# Patient Record
Sex: MALE | Race: WHITE | Employment: FULL TIME | URBAN - METROPOLITAN AREA
[De-identification: names, ages, dates, MRNs, and addresses within clinical notes are randomized per-mention and may not be internally consistent; named-entity substitution may affect disease eponyms.]

---

## 2020-12-03 ENCOUNTER — OFFICE VISIT (OUTPATIENT)
Dept: ORTHOPEDIC SURGERY | Age: 60
End: 2020-12-03
Payer: COMMERCIAL

## 2020-12-03 VITALS — BODY MASS INDEX: 38.14 KG/M2 | HEIGHT: 67 IN | WEIGHT: 243 LBS

## 2020-12-03 PROCEDURE — L1830 KO IMMOB CANVAS LONG PRE OTS: HCPCS | Performed by: ORTHOPAEDIC SURGERY

## 2020-12-03 PROCEDURE — 99203 OFFICE O/P NEW LOW 30 MIN: CPT | Performed by: ORTHOPAEDIC SURGERY

## 2020-12-03 NOTE — PROGRESS NOTES
Chief Complaint    Knee Pain (LEFT KNEE: 10 year hx of pain, has had several cortisone injections. ACL TEAR in college. )      History of Present Illness:  Kendall Ferrer is a 61 y.o. male who comes in today for evaluation and treatment of longstanding left knee pain. He is referred in today for orthopedic consultation at the request of his PCP Dr. Orly Harrell. He had a long history of left knee troubles. Had a multiligament reconstruction over 30 years ago out in [place]. Since then he has had about 10 years worth of progressively worsening left knee pain. He has been seen Dr. Swapna Anders  and has had multiple cortisone injections over the year. He is also tried different oral analgesics such as ibuprofen and Tylenol, off-the-shelf bracing, and has made a significant change in his quality of life and activities of daily living. Reports that the pain at rest is about a 4/10 in a constant dull aching discomfort but can increase to a 6 or 7/10. It does cause intermittent sleep disturbances at night. He comes in today and she discussing options for treatment. PAIN:   Pain Assessment  Location of Pain: Knee  Location Modifiers: Left  Severity of Pain: 4  Quality of Pain: Aching, Dull, Throbbing  Frequency of Pain: Intermittent  Result of Injury: No  Work-Related Injury: No  Are there other pain locations you wish to document?: No    The patients chronic pain has gradually worsening over the last 10 years. The patient rates pain on a level of 7/10. Pain impacts patients ability to drive, sleep and climb stairs.       Medical History:     Past Medical History:   Diagnosis Date    Allergic rhinitis     Colorectal polyps 3/21/11    tubular adenoma on colonoscopy repeat in 5 years - Dr. Daphnie Farfan Hyperlipidemia     Oral herpes      Patient Active Problem List    Diagnosis Date Noted    Hyperlipidemia 09/03/2010    Cold sore 08/10/2010     Current Outpatient Medications   Medication Sig Dispense Refill    fluticasone (FLONASE) 50 MCG/ACT nasal spray 1 spray by Nasal route daily. (Patient not taking: Reported on 12/3/2020) 1 Bottle 3    famciclovir (FAMVIR) 500 MG tablet Take 1 tablet by mouth 3 times daily as needed. (Patient not taking: Reported on 12/3/2020) 21 tablet 0     No current facility-administered medications for this visit. Medication Management  Patient has been treated with NSAIDs, Steroids and Analgesics with Minimal relief for 10 years. Review of Systems:  Relevant review of systems dated 12/3/2020 was reviewed and available in the patient's chart under the media tab. Vital Signs: There were no vitals filed for this visit. Body mass index is 38.06 kg/m². Limitation in Activities of Daily Living (ADLs)  The patient is able to ambulate without the assistance of cane and walker for 5 steps  steps/feet. Walking distance less than 1 block    Patient needs assistance with activities of daily living bathing, cooking and work. [facility]: No     Safety Issues: Difficulty with daily activities and Risk of falls  Patient is at risk for falls/fall history: Yes    General Exam:   Constitutional: Patient is adequately groomed with no evidence of malnutrition  DTRs: Deep tendon reflexes are intact  Mental Status: The patient is oriented to time, place and person. The patient's mood and affect are appropriate. Lymphatic: The lymphatic examination bilaterally reveals all areas to be without enlargement or induration. Vascular: Examination reveals no swelling or calf tenderness. Peripheral pulses are palpable and 2+. Neurological: The patient has good coordination. There is no weakness or sensory deficit. Left Knee Examination:    Inspection:  No erythema or signs of infection. Positive knee effusion. There are no cutaneous lesions. There is a large medial scar.     Palpation:  There is tenderness to palpation along the medail joint line. Range of Motion:  5 extension to 115 of active flexion. Crepitation present throughout range of motion    Strength:  4+/5 quadriceps and hamstrings    Special Tests: The knee is stable to varus valgus stressing/anterior posterior drawer. Negative Homans test.                                 Skin: There are no rashes, ulcerations or lesions. Gait: mildly antalgic favoring the left side    Reflex 2+ patellar    Additional Comments:     Examination of the right knee reveals intact skin. There is no focal tenderness. The patient demonstrates full painless range of motion with regard to flexion and extension. Strength is 5/5 throughout all planes. Ligamentous stability is grossly intact. Examination of the left hip reveals intact skin. The patient demonstrates full painless range of motion with regards to flexion, abduction, internal and external rotation. There is no tenderness about the greater trochanter. There is a negative straight leg raise against resistance. Strength is 5/5 throughout all planes. Radiology:   X-rays obtained and reviewed in office:  Views 4  Location AP flexed knee lateral and sunrise views of the left knee:    Impression:   There is severe, bone-on-bone osteoarthritis of the medial compartment  with sclerosis and osteophyte formation present. The patellofemoral joint moderate patellofemoral arthritis. There is a staple in the medial femoral condyle from his previous surgery  No fractures are identified. Failed Outpatient management or Previous Surgical Intervention  Patient has undergone conservative treatment of left knee osteoarthritis for the past 10 years.   Patient has undergone therapy consisting of at least 3 months of physical therapy which included muscle strengthening and flexibility program, cortisone injections, Visco supplementation, different oral medications including NSAIDs and analgesics, efforts at weight loss, and activity modification years with no improvement in  pain relief or function. Impression:  Encounter Diagnoses   Name Primary?  Left knee pain, unspecified chronicity     Primary osteoarthritis of left knee Yes       Office Procedures:  Orders Placed This Encounter   Procedures    XR KNEE LEFT (MIN 4 VIEWS)     Standing Status:   Future     Number of Occurrences:   1     Standing Expiration Date:   12/2/2021   [facility] Physical Therapy     Referral Priority:   Routine     Referral Type:   Eval and Treat     Referral Reason:   Specialty Services Required     Requested Specialty:   Physical Therapy     Number of Visits Requested:   1    DJO Knee Immobilizer     Patient was prescribed a DJO Knee Immobilizer. The left knee will require stabilization / immobilization from this semi-rigid / rigid orthosis to improve their function. The orthosis will assist in protecting the affected area, provide functional support and facilitate healing. The prefabricated orthosis was modified in the following manner to provide a customizable fit for the patient at the time of delivery. 1. Identification of appropriate positioning and alignment of anatomical landmarks. 2. Trimming of straps and panels. Reassemble orthosis to specifically fit patient. The patient was educated and fit by a healthcare professional with expert knowledge and specialization in brace application while under the direct supervision of the treating physician. Verbal and written instructions for the use of and application of this item were provided. They were instructed to contact the office immediately should the brace result in increased pain, decreased sensation, increased swelling or worsening of the condition. Treatment Plan:  I discussed with the patient the nature of osteoarthritis of the knee. We talked about treatment of arthritis and the various options that are involved with this.  The patient understands that the treatments can parapatellar arthrotomy with a 7 cm skin bridge. This does require nondiagnostic CT scan for surgical planning. The patient is aware that this may or may not be covered by the insurance provider and would be an out-of-pocket expense. We discussed the risk, benefits, and potential complications of knee replacement arthroplasty surgery. The patient voiced their understanding to concerns that include infection, deep vein thrombosis, neurological injury, and delayed rehabilitation. The patient also realizes that there are concerns regarding the potential need for manipulation under anesthesia if range of motion proves to be problematic. The patient also understands that there is always a chance of dystrophy and anesthetic complications that would include a stroke, cardiopulmonary pathology, and even death. We also discussed the rehabilitation process involved with this operation and options that involved not only the hospitalization but outpatient physical therapy and independent home exercise program.  The patient also realizes the need for a knee brace and ambulatory aids in the rehabilitation process as well as the very significant role that the patient plays in terms of rehabilitation after this type of operation. The patient also understands that although the patient typically functional by 6-8 weeks postop that it may take 9 months to year for full recovery. All questions were answered. Demand matching completed advanced.

## 2020-12-03 NOTE — LETTER
CONSENT TO SURGICAL OR MEDICAL PROCEDURE    PATIENTS NAMES: Radha Cantor 1960  61 y.o. [phone] (home) [phone] (work)  DATE/TIME: 12/3/2020 12:12 PM    1) I consent that Dr. Narcisa Gatica perform one or more surgical and or medical procedures on the above named patient at: [facility]/[place] [address] to treat the condition(s) which appear indicated by the diagnostic studies already preformed. I have been told in general terms the nature and purpose of the procedure(s) and what the procedure(s) is/are expected to accomplish. They procedure(s) are as follows:   LEFT GM TOTAL KNEE REPLACEMENT     2) It has been explained to me by the informing physician that during the course of any surgical or medical procedure unforeseen condition(s) may be revealed that necessitate an extension of the original procedure(s) or a different procedure(s) than set forth in Paragraph 1. I therefore consent that the above named physician perform such additional or different procedure(s) as are necessary or desirable in the exercise of his professional judgement. 3) I have been made aware by the informing physician of certain reasonably known risks that are associated with the procedure(s) set forth in Paragraph 1.  I understand the reasonably known risk to be: Including but not limited to: CVA, infection, M.I., Phlebitis, Cardiac Arrest and Pulmonary Embolism, Loss of Circulation, Nerve Injury, Delayed Healing, Recurrence, Loss of extremity/digit, R.S.D., Screw breakage, Arthritis, Pain, Swelling, Stiffness, Failure of Prothesis, Fracture, Leg length discrepancy, Wound complication/non-healing, need for further surgery and persistent pain. 4) I have also been informed by the informing physician that there are other risks, from both known and unknown causes, that are attendant to the performance of any surgical or medical procedure(s).   I am aware that the practice of surgery and medicine is not an exact science, and I acknowledge that no guarantees have been made to me concerning the results of the procedure(s). 5) I consent to the taking of photographs before, during and after the procedure(s) for scientific and educational purposes. I also understand that medical students and residents may participate in the procedure(s) set forth in Paragraph 1, and I consent to their participation under the supervision of the above named physician. 6) I consent to the administration of anesthesia and to the use of such anesthetics as may be deemed advisable by the anesthesiologist engaged to administer anesthesia. 7) I certify that I have read and understand this consent to the surgical or medical procedure(s); that all the information contained herein was disclosed to me by the informing physician prior to my signing; that all blanks or statements requiring insertions or completion were filled in and inapplicable paragraphs, if any, were stricken before I signed; and that all questions asked by me about the procedure(s) have been fully answered by the informing physician in a satisfactory manner.    ________________________________                           _______________________________  Signature of patient                                                                  Laurel Cordova M.D.  ________________________________                           ________________________________  Signature of Informing Physician                                           Informing Physician (Print)    If patient is unable to sign or is a minor, complete one of the following:   A) Patient is a minor ______________ years of age.    B) Patient is unable to sign because_________________________________________________ The undersigned represents that he or she is duly authorized to execute to this consent for and on the behalf of the above named patient.    ________________________________             __________________________________________  Witness                                                                         Parent/Spouse/Guardian/Other:_________________    Medical Record#  Insurance  Smartphone:  Yes   Or   No  Email:                   You have signed a consent to have a total joint replacement surgery performed. Before you can proceed with surgery the following things must be done. Please use this form as a checklist.      _____   Please schedule your Physical Therapy functional evaluation. (Allowed locations are listed on the order)    _____   Please take your lab orders and get your blood work done at a [facility]. (If needed, please use the web site to find the location closest to you:  [email or web address]) You do not need an appointment and you do not need to fast.    _____  If you did not register in the office, you will need to go to the website for [facility] ([email or web address]) to complete registration and the medical questionnaire prior to your physical therapy evaluation. Do not schedule an appointment with your primary care physician until you have a surgery date. This pre-op history and physical has to be within 30 days of the surgery. _____  CT Scan. This will be scheduled once your surgery has been scheduled. _____  Information about the pre-op class, your CT scan, your post-op appointment and cold therapy options will be in your surgery packet that will be mailed to you after you are scheduled for surgery. Once you have completed both the labs and the Physical Therapy evaluation please call Kimberley Service @ [phone] ([phone])  to let her know.   Once verification of the PT Evaluation and completed labs has been determined you will be called and set up for surgery. This may take 1-2 days to check results and return your phone call. You will not be called about lab results if everything is normal.      Please make sure you have not blocked our number. Fransisco Cunha will call from [phone]. Also, please make sure your voice mail is not full.

## 2020-12-03 NOTE — LETTER
Attention    These are medical screening labs, not pre-admission surgery labs. Nicole Stout M.D. Phone: [phone] / [phone] ([id number])   Fax:  [phone], [address] [place] [address]    Date:  12/3/2020    Name: Cameron Michel    : 1960    Please take this form with you.       THE FOLLOWING LABS NEED TO BE COMPLETED:    _x__UA  _x__URINE C/S (THIS NEEDS TO BE DONE EVEN IF THE UA IS NORMAL)  _x__CBC W/ DIFF  _x__PT/INR  _x__PTT  _x__TRANSFERRIN  _x__ALBUMIN  _x__CHEM 7  _x__HEMAGLOBIN A1-C  ____OTHER: _____________________________________________                         Diagnosis:  LEFT KNEE OSTEOARTHRITIS            RT KNEE OA M17.11      LT KNEE OA M17.12         RT HIP OA  M16.11         LT HIP OA M16.12         RT SHLD OA  M19.011   LT SHLD OA  M19.012             Z01.812  (Pre-op examination code)      12/3/2020 12:12 PM  Adelaide Christianson PA-C

## 2020-12-14 ENCOUNTER — HOSPITAL ENCOUNTER (OUTPATIENT)
Age: 60
Setting detail: THERAPIES SERIES
Discharge: HOME | End: 2020-12-14
Payer: COMMERCIAL

## 2020-12-14 ENCOUNTER — HOSPITAL ENCOUNTER (OUTPATIENT)
Dept: PHYSICAL THERAPY | Age: 60
Setting detail: THERAPIES SERIES
Discharge: HOME | End: 2020-12-14
Payer: COMMERCIAL

## 2020-12-14 ENCOUNTER — HOSPITAL ENCOUNTER (OUTPATIENT)
Age: 60
Discharge: HOME | End: 2020-12-18
Payer: COMMERCIAL

## 2020-12-14 LAB
ALBUMIN SERPL-MCNC: 4.4 G/DL (ref 3.4–5)
ANION GAP SERPL CALCULATED.3IONS-SCNC: 12 MMOL/L (ref 3–16)
APTT: 34.8 SEC (ref 24.2–36.2)
BASOPHILS ABSOLUTE: 0.1 K/UL (ref 0–0.2)
BASOPHILS RELATIVE PERCENT: 0.7 %
BILIRUBIN URINE: NEGATIVE
BLOOD, URINE: NEGATIVE
BUN BLDV-MCNC: 20 MG/DL (ref 7–20)
CALCIUM SERPL-MCNC: 9.3 MG/DL (ref 8.3–10.6)
CHLORIDE BLD-SCNC: 102 MMOL/L (ref 99–110)
CLARITY: CLEAR
CO2: 26 MMOL/L (ref 21–32)
COLOR: YELLOW
CREAT SERPL-MCNC: 1 MG/DL (ref 0.8–1.3)
EOSINOPHILS ABSOLUTE: 0.2 K/UL (ref 0–0.6)
EOSINOPHILS RELATIVE PERCENT: 2.2 %
GFR AFRICAN AMERICAN: >60
GFR NON-AFRICAN AMERICAN: >60
GLUCOSE BLD-MCNC: 129 MG/DL (ref 70–99)
GLUCOSE URINE: NEGATIVE MG/DL
HCT VFR BLD CALC: 45.5 % (ref 40.5–52.5)
HEMOGLOBIN: 15.6 G/DL (ref 13.5–17.5)
INR BLD: 0.93 (ref 0.86–1.14)
KETONES, URINE: NEGATIVE MG/DL
LEUKOCYTE ESTERASE, URINE: NEGATIVE
LYMPHOCYTES ABSOLUTE: 2.5 K/UL (ref 1–5.1)
LYMPHOCYTES RELATIVE PERCENT: 26.7 %
MCH RBC QN AUTO: 31.3 PG (ref 26–34)
MCHC RBC AUTO-ENTMCNC: 34.3 G/DL (ref 31–36)
MCV RBC AUTO: 91.2 FL (ref 80–100)
MICROSCOPIC EXAMINATION: NORMAL
MONOCYTES ABSOLUTE: 0.7 K/UL (ref 0–1.3)
MONOCYTES RELATIVE PERCENT: 7.1 %
NEUTROPHILS ABSOLUTE: 5.8 K/UL (ref 1.7–7.7)
NEUTROPHILS RELATIVE PERCENT: 63.3 %
NITRITE, URINE: NEGATIVE
PDW BLD-RTO: 13.6 % (ref 12.4–15.4)
PH UA: 5.5 (ref 5–8)
PLATELET # BLD: 346 K/UL (ref 135–450)
PMV BLD AUTO: 7.3 FL (ref 5–10.5)
POTASSIUM SERPL-SCNC: 4 MMOL/L (ref 3.5–5.1)
PROTEIN UA: NEGATIVE MG/DL
PROTHROMBIN TIME: 10.8 SEC (ref 10–13.2)
RBC # BLD: 4.99 M/UL (ref 4.2–5.9)
SODIUM BLD-SCNC: 140 MMOL/L (ref 136–145)
SPECIFIC GRAVITY UA: >=1.03 (ref 1–1.03)
TRANSFERRIN: 226 MG/DL (ref 200–360)
URINE TYPE: NORMAL
UROBILINOGEN, URINE: 0.2 E.U./DL
WBC # BLD: 9.2 K/UL (ref 4–11)

## 2020-12-14 PROCEDURE — 81003 URINALYSIS AUTO W/O SCOPE: CPT

## 2020-12-14 PROCEDURE — 82040 ASSAY OF SERUM ALBUMIN: CPT

## 2020-12-14 PROCEDURE — 83036 HEMOGLOBIN GLYCOSYLATED A1C: CPT

## 2020-12-14 PROCEDURE — 85025 COMPLETE CBC W/AUTO DIFF WBC: CPT

## 2020-12-14 PROCEDURE — 97161 PT EVAL LOW COMPLEX 20 MIN: CPT

## 2020-12-14 PROCEDURE — 85610 PROTHROMBIN TIME: CPT

## 2020-12-14 PROCEDURE — 97110 THERAPEUTIC EXERCISES: CPT

## 2020-12-14 PROCEDURE — 87086 URINE CULTURE/COLONY COUNT: CPT

## 2020-12-14 PROCEDURE — 36415 COLL VENOUS BLD VENIPUNCTURE: CPT

## 2020-12-14 PROCEDURE — 80048 BASIC METABOLIC PNL TOTAL CA: CPT

## 2020-12-14 PROCEDURE — 85730 THROMBOPLASTIN TIME PARTIAL: CPT

## 2020-12-14 PROCEDURE — 84466 ASSAY OF TRANSFERRIN: CPT

## 2020-12-14 NOTE — PLAN OF CARE
[place] [address] [facility], [address]  Phone: [phone]  Fax [phone]     Physical Therapy Certification    Dear Referring Practitioner: Devante Menjivar MD,    We had the pleasure of evaluating the following patient for physical therapy services at [address] [place] [address]. A summary of our findings can be found in the initial assessment below. This includes our plan of care. If you have any questions or concerns regarding these findings, please do not hesitate to contact me at the office phone number checked above. Thank you for the referral.       Physician Signature:_______________________________Date:__________________  By signing above (or electronic signature), therapists plan is approved by physician    Patient: Inessa Garvin   : 1960   MRN: [id number]  Referring Physician: Referring Practitioner: Devante Menjivar MD      Evaluation Date: 2020      Medical Diagnosis Information:  Diagnosis: M25.562 L knee pain; M17.12 Primary OA L knee   Treatment Diagnosis: M25.562 L knee pain                                         Insurance information: PT Insurance Information: BCBS     Precautions/ Contra-indications: None  Latex Allergy:  [x]NO      []YES  Preferred Language for Healthcare:   [x]English       []other:    SUBJECTIVE: Patient stated complaint: Patient reports a long history of Left knee pain and previous hx of L knee scope but has continued pain.      Relevant Medical History: previous L knee scope   Functional Disability Index: LEFS: 20/80     Height 5'7\" Weight 243 lb  Pain Scale: 2/10  Easing factors: rest, ice  Provocative factors: long walks/hike, playing tennis    Type: []Constant   [x]Intermittent  []Radiating []Localized []other:     Numbness/Tingling: None[]    Occupation/School: manufacturing    Living Status/Prior Level of Function: Independent with ADLs and IADLs, Patient is very active and would like to play tennis and golf. OBJECTIVE:    Media Information            Document Information    Physical Therapy   prehab assessment form   12/14/2020 15:40   Attached To:   Donta Alford           Reflexes/Myotomes:    [x]Dermatomes/Myotomes intact    [x]Reflexes equal and normal bilaterally   []Other:    Joint mobility: L knee   []Normal    [x]Hypo   []Hyper    Palpation: None    Functional Mobility/Transfers: WFL    Posture: WFL     Gait: (include devices/WB status) Trendelenburg gait mildly decreased stance on L at end of 6MWT    Orthopedic Special Tests: N/A                       [x] Patient history, allergies, meds reviewed. Medical chart reviewed. See intake form. Review Of Systems (ROS):  [x]Performed Review of systems (Integumentary, CardioPulmonary, Neurological) by intake and observation. Intake form has been scanned into medical record. Patient has been instructed to contact their primary care physician regarding ROS issues if not already being addressed at this time.       Co-morbidities/Complexities (which will affect course of rehabilitation):    [x]None           Arthritic conditions   []Rheumatoid arthritis (M05.9)  []Osteoarthritis (M19.91)   Cardiovascular conditions   []Hypertension (I10)  []Hyperlipidemia (E78.5)  []Angina pectoris (I20)  []Atherosclerosis (I70)   Musculoskeletal conditions   []Disc pathology   []Congenital spine pathologies   []Prior surgical intervention  []Osteoporosis (M81.8)  []Osteopenia (M85.8)   Endocrine conditions   []Hypothyroid (E03.9)  []Hyperthyroid Gastrointestinal conditions   []Constipation (U41.63)   Metabolic conditions   []Morbid obesity (E66.01)  []Diabetes type 1(E10.65) or 2 (E11.65)   []Neuropathy (G60.9)     Pulmonary conditions   []Asthma (J45)  []Coughing   []COPD (J44.9)   Psychological Disorders  []Anxiety (F41.9)  []Depression (F32.9)   []Other:   []Other:          Barriers to/and or personal factors that will affect rehab potential:              []Age  []Sex              []Motivation/Lack of Motivation                        []Co-Morbidities              []Cognitive Function, education/learning barriers              []Environmental, home barriers              []profession/work barriers  []past PT/medical experience  []other:  Justification: Patient is expected to rehab well with skilled PT. Falls Risk Assessment (30 days): No signs of fall risk during functional testing. [x] Falls Risk assessed and no intervention required.   [] Falls Risk assessed and Patient requires intervention due to being higher risk   TUG score (>12s at risk):     [] Falls education provided, including          ASSESSMENT:   Functional Impairments:     [x]Noted lumbar/proximal hip/LE joint hypomobility   [x]Decreased LE functional ROM   [x]Decreased core/proximal hip strength and neuromuscular control   [x]Decreased LE functional strength   []Reduced balance/proprioceptive control   []other:      Functional Activity Limitations (from functional questionnaire and intake)   [x]Reduced ability to tolerate prolonged functional positions   []Reduced ability or difficulty with changes of positions or transfers between positions   []Reduced ability to maintain good posture and demonstrate good body mechanics with sitting, bending, and lifting   []Reduced ability to sleep   [] Reduced ability or tolerance with driving and/or computer work   [x]Reduced ability to perform lifting, carrying tasks   []Reduced ability to squat   []Reduced ability to forward bend   [x]Reduced ability to ambulate prolonged functional periods/distances/surfaces   [x]Reduced ability to ascend/descend stairs   [x]Reduced ability to run, hop, cut or jump   []other:    Participation Restrictions   []Reduced participation in self care activities   []Reduced participation in home management activities   [x]Reduced participation in work activities   [x]Reduced participation in social activities. [x]Reduced participation in sport/recreation activities. Classification :    []Signs/symptoms consistent with post-surgical status including decreased ROM, strength and function. []Signs/symptoms consistent with joint sprain/strain   []Signs/symptoms consistent with patella-femoral syndrome   [x]Signs/symptoms consistent with knee OA/hip OA   []Signs/symptoms consistent with internal derangement of knee/Hip   []Signs/symptoms consistent with functional hip weakness/NMR control      []Signs/symptoms consistent with tendinitis/tendinosis    []signs/symptoms consistent with pathology which may benefit from Dry needling      []other:      Prognosis/Rehab Potential:      [x]Excellent   []Good    []Fair   []Poor    Tolerance of evaluation/treatment:    [x]Excellent   []Good    []Fair   []Poor  Physical Therapy Evaluation Complexity Justification  [x] A history of present problem with:  [x] no personal factors and/or comorbidities that impact the plan of care;  []1-2 personal factors and/or comorbidities that impact the plan of care  []3 personal factors and/or comorbidities that impact the plan of care  [x] An examination of body systems using standardized tests and measures addressing any of the following: body structures and functions (impairments), activity limitations, and/or participation restrictions;:  [x] a total of 1-2 or more elements   [] a total of 3 or more elements   [] a total of 4 or more elements   [x] A clinical presentation with:  [x] stable and/or uncomplicated characteristics   [] evolving clinical presentation with changing characteristics  [] unstable and unpredictable characteristics;   [x] Clinical decision making of [x] low, [] moderate, [] high complexity using standardized patient assessment instrument and/or measurable assessment of functional outcome.     [x] EVAL (LOW) 09836 (typically 20 minutes face-to-face)  [] EVAL (MOD) 34414 (typically 30 minutes

## 2020-12-14 NOTE — FLOWSHEET NOTE
[facility],  [address]  Phone: [phone]  Fax [phone]    Physical Therapy Daily Treatment Note  Date:  2020    Patient Name:  Cameron Michel    :  1960  MRN: [id number]  Restrictions/Precautions:    Medical/Treatment Diagnosis Information:  · Diagnosis: M25.562 L knee pain; M17.12 Primary OA L knee  · Treatment Diagnosis: M25.562 L knee pain  Insurance/Certification information:  PT Insurance Information: Layne Yao  Physician Information:  Referring Practitioner: Ashley Reyes MD  Plan of care signed (Y/N):     Date of Patient follow up with Physician: not yet scheduled     G-Code (if applicable):      Date G-Code Applied:         Progress Note: [x]  Yes  []  No  Next due by: Visit #10       Latex Allergy:  [x]NO      []YES   Preferred Language for Healthcare:   [x]English       []other:        Visit # Insurance Allowable Auth Required   In-person 1 20 [x]  Yes []  No    Telehealth     []  Yes []  No    Total        Pain level:  2/10     SUBJECTIVE:  See eval    OBJECTIVE: See eval   Observation:    Test measurements:      RESTRICTIONS/PRECAUTIONS: None    Exercises/Interventions:     Therapeutic Ex Sets/reps Notes        Seated HS stretch 3x30 sec HEP   Seated calf stretch 3x30 sec HEP   Seated QS BLEs 10 sec 10x HEP   SLR FLX supine 1x10 HEP   Seated heelslide with strap 10 sec 10x HEP   LAQ  1x10 HEP   minisquats 1x10 HEP                            Pt ed on current condition, POC, expectations for therapy and sx, and HEP 8'              Manual Intervention                                   NMR re-education                                                      Therapeutic Exercise and NMR EXR  [x] (81133) Provided verbal/tactile cueing for activities related to strengthening, flexibility, endurance, ROM for improvements in LE, proximal hip, and core control with self care, mobility, lifting, ambulation.  [] (23532) Provided verbal/tactile cueing for activities related to improving balance, coordination, kinesthetic sense, posture, motor skill, proprioception  to assist with LE, proximal hip, and core control in self care, mobility, lifting, ambulation and eccentric single leg control. NMR and Therapeutic Activities:    [] (13587 or 64300) Provided verbal/tactile cueing for activities related to improving balance, coordination, kinesthetic sense, posture, motor skill, proprioception and motor activation to allow for proper function of core, proximal hip and LE with self care and ADLs  [] (79101) Gait Re-education- Provided training and instruction to the patient for proper LE, core and proximal hip recruitment and positioning and eccentric body weight control with ambulation re-education including up and down stairs     Home Exercise Program:    [x] (34163) Reviewed/Progressed HEP activities related to strengthening, flexibility, endurance, ROM of core, proximal hip and LE for functional self-care, mobility, lifting and ambulation/stair navigation   [] (37574)Reviewed/Progressed HEP activities related to improving balance, coordination, kinesthetic sense, posture, motor skill, proprioception of core, proximal hip and LE for self care, mobility, lifting, and ambulation/stair navigation      Manual Treatments:  PROM / STM / Oscillations-Mobs:  G-I, II, III, IV (PA's, Inf., Post.)  [] (49080) Provided manual therapy to mobilize LE, proximal hip and/or LS spine soft tissue/joints for the purpose of modulating pain, promoting relaxation,  increasing ROM, reducing/eliminating soft tissue swelling/inflammation/restriction, improving soft tissue extensibility and allowing for proper ROM for normal function with self care, mobility, lifting and ambulation.      Modalities:      Charges:  Timed Code Treatment Minutes: 23'   Total Treatment Minutes: 39'     [x] EVAL (LOW) 455 3643 (typically 20 minutes face-to-face)  [] EVAL (MOD) 31073 (typically 30 minutes face-to-face)  [] EVAL (HIGH) 66492 (typically 45 minutes face-to-face)  [] RE-EVAL     [x] SI(34640) x   2  [] IONTO  [] NMR (71664) x     [] VASO  [] Manual (22752) x      [] Other:  [] TA x      [] Mech Traction (77734)  [] ES(attended) (76935)      [] ES (un) (01150):     GOALS:   Patient stated goal: To be prepared for surgery      Therapist goals for Patient:    Short Term Goals: To be achieved in: 1 weeks  1. Independent in HEP and progression per patient tolerance, in order to prevent re-injury and to prepare for surgery by strength and flexibility therex . Progression Towards Functional goals:  [] Patient is progressing as expected towards functional goals listed. [] Progression is slowed due to complexities listed. [] Progression has been slowed due to co-morbidities.   [x] Plan just implemented, too soon to assess goals progression  [] Other:     ASSESSMENT:  See eval    Treatment/Activity Tolerance:  [x] Patient tolerated treatment well [] Patient limited by fatique  [] Patient limited by pain  [] Patient limited by other medical complications  [] Other:   [] Patient did not tolerate physical therapy activity due to substantial increase in pain    Prognosis: [x] Good [] Fair  [] Poor          Patient Requires Follow-up: [] Yes  [x] No    PLAN: See eval       [] Continue per plan of care  [] Alter current plan (see comments)  [x] Plan of care initiated [] Discharge    [x] Discharge to home program at this time due to inability to tolerate physical therapy activity       Electronically signed by: Donte Gonzalez, PT, DPT, [address] [address] [id number]

## 2020-12-15 LAB
ESTIMATED AVERAGE GLUCOSE: 108.3 MG/DL
HBA1C MFR BLD: 5.4 %
URINE CULTURE, ROUTINE: NORMAL

## 2021-01-07 ENCOUNTER — OFFICE VISIT (OUTPATIENT)
Dept: PRIMARY CARE CLINIC | Age: 61
End: 2021-01-07
Payer: COMMERCIAL

## 2021-01-07 ENCOUNTER — TELEPHONE (OUTPATIENT)
Dept: ORTHOPEDIC SURGERY | Age: 61
End: 2021-01-07

## 2021-01-07 DIAGNOSIS — Z01.818 PREOP TESTING: Primary | ICD-10-CM

## 2021-01-07 LAB — SARS-COV-2: NOT DETECTED

## 2021-01-07 PROCEDURE — 99211 OFF/OP EST MAY X REQ PHY/QHP: CPT | Performed by: NURSE PRACTITIONER

## 2021-01-07 NOTE — PATIENT INSTRUCTIONS

## 2021-01-07 NOTE — TELEPHONE ENCOUNTER
CPT: [id number]  AUTH: [id number]  DATE RANGE: 1/13/21 TO 3/13/21  INSURANCE: BCBS  NOTE: DOC SCANNED

## 2021-01-07 NOTE — PROGRESS NOTES
Debra Jones received a viral test for COVID-19. They were educated on isolation and quarantine as appropriate. For any symptoms, they were directed to seek care from their PCP, given contact information to establish with a doctor, directed to an urgent care or the emergency room.

## 2021-01-11 NOTE — TELEPHONE ENCOUNTER
Per Nohemy Henry with [facility] @ [phone] location of surgery changed to [place].   Authorization stays the same

## 2021-01-12 DIAGNOSIS — M17.12 PRIMARY OSTEOARTHRITIS OF LEFT KNEE: Primary | ICD-10-CM

## 2021-01-12 RX ORDER — OXYCODONE HYDROCHLORIDE AND ACETAMINOPHEN 5; 325 MG/1; MG/1
1 TABLET ORAL EVERY 6 HOURS PRN
Qty: 28 TABLET | Refills: 0 | Status: SHIPPED | OUTPATIENT
Start: 2021-01-12 | End: 2021-01-19

## 2021-01-12 RX ORDER — MELOXICAM 15 MG/1
15 TABLET ORAL DAILY
Qty: 90 TABLET | Refills: 1 | Status: SHIPPED | OUTPATIENT
Start: 2021-01-12

## 2021-01-12 RX ORDER — CYCLOBENZAPRINE HCL 10 MG
10 TABLET ORAL 3 TIMES DAILY PRN
Qty: 30 TABLET | Refills: 0 | Status: SHIPPED | OUTPATIENT
Start: 2021-01-12 | End: 2021-01-22

## 2021-01-12 RX ORDER — ASPIRIN 325 MG
325 TABLET ORAL 2 TIMES DAILY
Qty: 60 TABLET | Refills: 0 | Status: SHIPPED | OUTPATIENT
Start: 2021-01-14

## 2021-01-12 RX ORDER — CEPHALEXIN 500 MG/1
500 CAPSULE ORAL 4 TIMES DAILY
Qty: 4 CAPSULE | Refills: 0 | Status: SHIPPED | OUTPATIENT
Start: 2021-01-12 | End: 2021-01-29 | Stop reason: ALTCHOICE

## 2021-01-12 RX ORDER — ONDANSETRON 4 MG/1
4 TABLET, FILM COATED ORAL EVERY 8 HOURS PRN
Qty: 30 TABLET | Refills: 0 | Status: SHIPPED | OUTPATIENT
Start: 2021-01-12 | End: 2021-01-29

## 2021-01-12 RX ORDER — METHYLPREDNISOLONE 4 MG/1
TABLET ORAL
Qty: 1 KIT | Refills: 0 | Status: SHIPPED | OUTPATIENT
Start: 2021-01-12 | End: 2021-01-18

## 2021-01-15 ENCOUNTER — HOSPITAL ENCOUNTER (OUTPATIENT)
Dept: PHYSICAL THERAPY | Age: 61
Setting detail: THERAPIES SERIES
Discharge: HOME | End: 2021-01-15
Payer: COMMERCIAL

## 2021-01-15 DIAGNOSIS — M17.12 PRIMARY OSTEOARTHRITIS OF LEFT KNEE: Primary | ICD-10-CM

## 2021-01-15 DIAGNOSIS — Z96.652 STATUS POST TOTAL KNEE REPLACEMENT, LEFT: ICD-10-CM

## 2021-01-15 PROCEDURE — 97110 THERAPEUTIC EXERCISES: CPT

## 2021-01-15 PROCEDURE — 97016 VASOPNEUMATIC DEVICE THERAPY: CPT

## 2021-01-15 PROCEDURE — 97161 PT EVAL LOW COMPLEX 20 MIN: CPT

## 2021-01-15 NOTE — PLAN OF CARE
[facility], [address]  Phone: [phone]  Fax [phone]     Physical Therapy Certification    Dear Referring Practitioner: Steven Giles MD,    We had the pleasure of evaluating the following patient for physical therapy services at [address] [place] [address]. A summary of our findings can be found in the initial assessment below. This includes our plan of care. If you have any questions or concerns regarding these findings, please do not hesitate to contact me at the office phone number checked above.   Thank you for the referral.       Physician Signature:_______________________________Date:__________________  By signing above (or electronic signature), therapists plan is approved by physician    Patient: Ashley Pal   : 1960   MRN: [id number]  Referring Physician: Referring Practitioner: tSeven Giles MD      Evaluation Date: 1/15/2021      Medical Diagnosis Information:  Diagnosis: M25.562 Left knee pain; M17.12 Primary OA of the Left knee   Treatment Diagnosis: M25.562 Left knee pain; M62.82 Left muscle weakness                                         Insurance information: PT Insurance Information: BCBS       Precautions/ Contra-indications: S/p L TKR on 21     C-SSRS Triggered by Intake questionnaire (Past 2 wk assessment):   [x] No, Questionnaire did not trigger screening.   [] Yes, Patient intake triggered further evaluation      [] C-SSRS Screening completed  [] PCP notified via Plan of Care  [] Emergency services notified     Latex Allergy:  [x]NO      []YES  Preferred Language for Healthcare:   [x]English       []other: [] Falls Risk assessed and Patient requires intervention due to being higher risk   TUG score (>12s at risk):     [] Falls education provided, including           ASSESSMENT:    Functional Impairments:     [x]Noted lumbar/proximal hip/LE joint hypomobility   [x]Decreased LE functional ROM   [x]Decreased core/proximal hip strength and neuromuscular control   [x]Decreased LE functional strength   [x]Reduced balance/proprioceptive control   []other:      Functional Activity Limitations (from functional questionnaire and intake)   [x]Reduced ability to tolerate prolonged functional positions   [x]Reduced ability or difficulty with changes of positions or transfers between positions   [x]Reduced ability to maintain good posture and demonstrate good body mechanics with sitting, bending, and lifting   [x]Reduced ability to sleep   [x] Reduced ability or tolerance with driving and/or computer work   [x]Reduced ability to perform lifting, carrying tasks   [x]Reduced ability to squat   [x]Reduced ability to forward bend   [x]Reduced ability to ambulate prolonged functional periods/distances/surfaces   [x]Reduced ability to ascend/descend stairs   [x]Reduced ability to run, hop, cut or jump   []other:    Participation Restrictions   [x]Reduced participation in self care activities   [x]Reduced participation in home management activities   [x]Reduced participation in work activities   [x]Reduced participation in social activities. [x]Reduced participation in sport/recreation activities. Classification :    [x]Signs/symptoms consistent with post-surgical status including decreased ROM, strength and function.    []Signs/symptoms consistent with joint sprain/strain   []Signs/symptoms consistent with patella-femoral syndrome   []Signs/symptoms consistent with knee OA/hip OA   []Signs/symptoms consistent with internal derangement of knee/Hip   []Signs/symptoms consistent with functional hip weakness/NMR control []Signs/symptoms consistent with tendinitis/tendinosis    []signs/symptoms consistent with pathology which may benefit from Dry needling      []other:      Prognosis/Rehab Potential:      [x]Excellent   []Good    []Fair   []Poor    Tolerance of evaluation/treatment:    [x]Excellent   []Good    []Fair   []Poor  Physical Therapy Evaluation Complexity Justification  [x] A history of present problem with:  [] no personal factors and/or comorbidities that impact the plan of care;  [x]1-2 personal factors and/or comorbidities that impact the plan of care  []3 personal factors and/or comorbidities that impact the plan of care  [x] An examination of body systems using standardized tests and measures addressing any of the following: body structures and functions (impairments), activity limitations, and/or participation restrictions;:  [x] a total of 1-2 or more elements   [] a total of 3 or more elements   [] a total of 4 or more elements   [x] A clinical presentation with:  [x] stable and/or uncomplicated characteristics   [] evolving clinical presentation with changing characteristics  [] unstable and unpredictable characteristics;   [x] Clinical decision making of [x] low, [] moderate, [] high complexity using standardized patient assessment instrument and/or measurable assessment of functional outcome. [x] EVAL (LOW) 91004 (typically 20 minutes face-to-face)  [] EVAL (MOD) 98000 (typically 30 minutes face-to-face)  [] EVAL (HIGH) 02685 (typically 45 minutes face-to-face)  [] RE-EVAL       PLAN:   Frequency/Duration:  2 days per week for 6-8 Weeks:  Interventions:  [x]  Therapeutic exercise including: strength training, ROM, for Lower extremity and core   [x]  NMR activation and proprioception for LE, Glutes and Core   [x]  Manual therapy as indicated for LE, Hip and spine to include: Dry Needling/IASTM, STM, PROM, Gr I-IV mobilizations, manipulation.

## 2021-01-15 NOTE — FLOWSHEET NOTE
[facility],  [address]  Phone: [phone]  Fax [phone]    Physical Therapy Treatment Note/ Progress Report:           Date:  1/15/2021    Patient Name:  Jimi Portillo    :  1960  MRN: [id number]  Restrictions/Precautions:    Medical/Treatment Diagnosis Information:  · Diagnosis: M25.562 Left knee pain; M17.12 Primary OA of the Left knee  · Treatment Diagnosis: M25.562 Left knee pain; M62.82 Left muscle weakness  Insurance/Certification information:  PT Insurance Information: Rhys Betancur 150  Physician Information:  Referring Practitioner: Dallin Flores MD  Has the plan of care been signed (Y/N):        []  Yes  [x]  No     Date of Patient follow up with Physician: 21      Is this a Progress Report:     []  Yes  [x]  No        If Yes:  Date Range for reporting period:  Beginning 1/15/21  Ending     Progress report will be due (10 Rx or 30 days whichever is less): 56       Recertification will be due (POC Duration  / 90 days whichever is less): 4/15/21      Visit # Insurance Allowable Auth Required   In-person 1 20 [x]  Yes []  No    Telehealth     []  Yes []  No    Total          Functional Scale: LEFS 58 (28% disability)     Date assessed:  1/15/21      Therapy Diagnosis/Practice Pattern: H     Number of Comorbidities:  []0     [x]1-2    []3+     Latex Allergy:  [x]NO      []YES  Preferred Language for Healthcare:   [x]English       []other:      Pain level:  0/10     SUBJECTIVE:  See eval    OBJECTIVE: See eval  ? Observation:   ? Test measurements:      RESTRICTIONS/PRECAUTIONS: S/p L TKR    Exercises/Interventions:     [employer]: [id number]    Therapeutic Ex (94472) Sets/sec Reps Notes/CUES   Seated HS stretch 30\"x3     Seated gastroc stretch 30\"x3     Quad set 5'Hx10     SLR 10x      Supine heel slides 10\"x10 Pt education on current condition, POC, expectations for therapy, and HEP 8'           Manual Intervention (12812)                                          NMR re-education (32956)   CUES NEEDED                                                         Therapeutic Activity (44797)                                                Therapeutic Exercise and NMR EXR  [x] (76560) Provided verbal/tactile cueing for activities related to strengthening, flexibility, endurance, ROM for improvements in LE, proximal hip, and core control with self care, mobility, lifting, ambulation.  [] (08647) Provided verbal/tactile cueing for activities related to improving balance, coordination, kinesthetic sense, posture, motor skill, proprioception  to assist with LE, proximal hip, and core control in self care, mobility, lifting, ambulation and eccentric single leg control.      NMR and Therapeutic Activities:    [] (35361 or 64727) Provided verbal/tactile cueing for activities related to improving balance, coordination, kinesthetic sense, posture, motor skill, proprioception and motor activation to allow for proper function of core, proximal hip and LE with self care and ADLs  [] (94722) Gait Re-education- Provided training and instruction to the patient for proper LE, core and proximal hip recruitment and positioning and eccentric body weight control with ambulation re-education including up and down stairs     Home Exercise Program:    [x] (08247) Reviewed/Progressed HEP activities related to strengthening, flexibility, endurance, ROM of core, proximal hip and LE for functional self-care, mobility, lifting and ambulation/stair navigation   [] (61590)Reviewed/Progressed HEP activities related to improving balance, coordination, kinesthetic sense, posture, motor skill, proprioception of core, proximal hip and LE for self care, mobility, lifting, and ambulation/stair navigation Manual Treatments:  PROM / STM / Oscillations-Mobs:  G-I, II, III, IV (PA's, Inf., Post.)  [] (26340) Provided manual therapy to mobilize LE, proximal hip and/or LS spine soft tissue/joints for the purpose of modulating pain, promoting relaxation,  increasing ROM, reducing/eliminating soft tissue swelling/inflammation/restriction, improving soft tissue extensibility and allowing for proper ROM for normal function with self care, mobility, lifting and ambulation. Modalities:     [x] GAME READY (VASO)- for significant edema, swelling, pain control. Charges:  Timed Code Treatment Minutes: 25'   Total Treatment Minutes: 48'         [x] EVAL (LOW) 33409   [] EVAL (MOD) 59948  [] EVAL (HIGH) 36794   [] RE-EVAL     [x] WR(97709) x  2   [] IONTO  [] NMR (00269) x     [x] VASO  [] Manual (12577) x      [] Other:  [] TA x      [] Mech Traction (14772)  [] ES(attended) (95371)      [] ES (un) (45507):       GOALS:   Patient stated goal: Patient will be able to walk without L knee pain and limited motion. [] Progressing: [] Met: [] Not Met: [] Adjusted    Therapist goals for Patient:   Short Term Goals: To be achieved in: 2 weeks  1. Independent in HEP and progression per patient tolerance, in order to prevent re-injury. [] Progressing: [] Met: [] Not Met: [] Adjusted  2. Patient will have a decrease in pain to facilitate improvement in movement, function, and ADLs as indicated by Functional Deficits. [] Progressing: [] Met: [] Not Met: [] Adjusted    Long Term Goals: To be achieved in: 6 weeks  1. Disability index score of 14% or less for the LEFS to assist with reaching prior level of function. [] Progressing: [] Met: [] Not Met: [] Adjusted  2. Patient will demonstrate increased AROM to L KF to 125 deg and L KE to 0 deg to allow for proper joint functioning as indicated by patients Functional Deficits.    [] Progressing: [] Met: [] Not Met: [] Adjusted 3. Patient will demonstrate an increase in Strength to good proximal hip strength and control, within 5lb HHD in LE to allow for proper functional mobility as indicated by patients Functional Deficits. [] Progressing: [] Met: [] Not Met: [] Adjusted  4. Patient will return to 95% of functional activities without increased symptoms or restriction. [] Progressing: [] Met: [] Not Met: [] Adjusted  5. Patient will be able to return to golf and hiking without L knee pain and limited motion(patient specific functional goal)    [] Progressing: [] Met: [] Not Met: [] Adjusted       Progression Towards Functional goals:  [] Patient is progressing as expected towards functional goals listed. [] Progression is slowed due to complexities listed. [] Progression has been slowed due to co-morbidities. [x] Plan just implemented, too soon to assess goals progression  [] Other:         Overall Progression Towards Functional goals/ Treatment Progress Update:  [] Patient is progressing as expected towards functional goals listed. [] Progression is slowed due to complexities/Impairments listed. [] Progression has been slowed due to co-morbidities.   [x] Plan just implemented, too soon to assess goals progression <30days   [] Goals require adjustment due to lack of progress  [] Patient is not progressing as expected and requires additional follow up with physician  [] Other    Prognosis for POC: [x] Good [] Fair  [] Poor      Patient requires continued skilled intervention: [x] Yes  [] No    Treatment/Activity Tolerance:  [x] Patient able to complete treatment  [] Patient limited by fatigue  [] Patient limited by pain    [] Patient limited by other medical complications  [] Other:     ASSESSMENT: See eval.     Return to Play: (if applicable)   []  Stage 1: Intro to Strength   []  Stage 2: Return to Run and Strength   []  Stage 3: Return to Jump and Strength   []  Stage 4: Dynamic Strength and Agility []  Stage 5: Sport Specific Training     []  Ready to Return to Play, Meets All Above Stages   []  Not Ready for Return to Sports   Comments:                               PLAN: See eval.   [] Continue per plan of care [] Alter current plan (see comments above)  [x] Plan of care initiated [] Hold pending MD visit [] Discharge      Electronically signed by:  Fe Pinon, PT, DPT, OCS [id number]     Note: If patient does not return for scheduled/ recommended follow up visits, this note will serve as a discharge from care along with most recent update on progress.

## 2021-01-19 ENCOUNTER — HOSPITAL ENCOUNTER (OUTPATIENT)
Dept: PHYSICAL THERAPY | Age: 61
Setting detail: THERAPIES SERIES
Discharge: HOME | End: 2021-01-19
Payer: COMMERCIAL

## 2021-01-19 PROCEDURE — 97110 THERAPEUTIC EXERCISES: CPT

## 2021-01-19 PROCEDURE — 97016 VASOPNEUMATIC DEVICE THERAPY: CPT

## 2021-01-19 PROCEDURE — 97140 MANUAL THERAPY 1/> REGIONS: CPT

## 2021-01-19 NOTE — FLOWSHEET NOTE
[facility],  [address]  Phone: [phone]  Fax [phone]    Physical Therapy Treatment Note/ Progress Report:           Date:  2021    Patient Name:  Dale Avendano    :  1960  MRN: [id number]  Restrictions/Precautions:    Medical/Treatment Diagnosis Information:  · Diagnosis: M25.562 Left knee pain; M17.12 Primary OA of the Left knee  · Treatment Diagnosis: M25.562 Left knee pain; M62.82 Left muscle weakness  Insurance/Certification information:  PT Insurance Information: [facility]  Physician Information:  Referring Practitioner: Alec Cabrera MD  Has the plan of care been signed (Y/N):        []  Yes  [x]  No     Date of Patient follow up with Physician: 21      Is this a Progress Report:     []  Yes  [x]  No        If Yes:  Date Range for reporting period:  Beginning 1/15/21  Ending     Progress report will be due (10 Rx or 30 days whichever is less):        Recertification will be due (POC Duration  / 90 days whichever is less): 4/15/21      Visit # Insurance Allowable Auth Required   In-person 2 20 [x]  Yes []  No    Telehealth     []  Yes []  No    Total          Functional Scale: LEFS 58 (28% disability)     Date assessed:  1/15/21      Therapy Diagnosis/Practice Pattern: H     Number of Comorbidities:  []0     [x]1-2    []3+     Latex Allergy:  [x]NO      []YES  Preferred Language for Healthcare:   [x]English       []other:      Pain level:  0/10 no pain medications taken since Friday. SUBJECTIVE:  Patient reports no issues with his knee and it has continued to feel pretty good. He stopped taking pain medications on Friday.      OBJECTIVE: See eval   Observation: bruising along incision, mild bruising proximally likely from tourniquet      Test measurements:       PT Practice Pattern:   Co morbidities:   surgical   INITIAL VISIT  CURRENT VISIT   PAIN  0/10 0/10    FUNCTIONAL SCALE LEFS 58/80; 28% disability  NT   ROM L KF 65 deg       L KE Lacking 15 deg                                     STRENGHT (MMT) L quad Good; 10 SLR w mild quad lag                                                           RESTRICTIONS/PRECAUTIONS: S/p L TKR    Exercises/Interventions:     Medbridge: [id number]    Therapeutic Ex (77502) Sets/sec Reps Notes/CUES   Seated HS stretch 30\"x3     Seated gastroc stretch 30\"x3     Quad set 5'Hx10     SLR 10x 2, 3\"H     Supine heel slides 10\"x10           SB KF stretch w strap  30\"x3     SL hip abd 2x10     SB Bridges  2x10, 3\"H           Pt education on current condition, POC, expectations for therapy, and HEP             Manual Intervention ([id number])      effleurage massage for swelling, PFJ mobs, PROM and gentle stretching of L knee 10'                                   NMR re-education (42721)   CUES NEEDED                                                         Therapeutic Activity (18969)                                                Therapeutic Exercise and NMR EXR  [x] (50838) Provided verbal/tactile cueing for activities related to strengthening, flexibility, endurance, ROM for improvements in LE, proximal hip, and core control with self care, mobility, lifting, ambulation.  [] (17079) Provided verbal/tactile cueing for activities related to improving balance, coordination, kinesthetic sense, posture, motor skill, proprioception  to assist with LE, proximal hip, and core control in self care, mobility, lifting, ambulation and eccentric single leg control.      NMR and Therapeutic Activities:    [] (32946 or 01095) Provided verbal/tactile cueing for activities related to improving balance, coordination, kinesthetic sense, posture, motor skill, proprioception and motor activation to allow for proper function of core, proximal hip and LE with self care and ADLs  [] (85658) Gait Re-education- Provided training and instruction to the patient for proper LE, core and proximal hip recruitment and positioning and eccentric body weight control with ambulation re-education including up and down stairs     Home Exercise Program:    [x] (21678) Reviewed/Progressed HEP activities related to strengthening, flexibility, endurance, ROM of core, proximal hip and LE for functional self-care, mobility, lifting and ambulation/stair navigation   [] (42390)Reviewed/Progressed HEP activities related to improving balance, coordination, kinesthetic sense, posture, motor skill, proprioception of core, proximal hip and LE for self care, mobility, lifting, and ambulation/stair navigation      Manual Treatments:  PROM / STM / Oscillations-Mobs:  G-I, II, III, IV (PA's, Inf., Post.)  [x] (17039) Provided manual therapy to mobilize LE, proximal hip and/or LS spine soft tissue/joints for the purpose of modulating pain, promoting relaxation,  increasing ROM, reducing/eliminating soft tissue swelling/inflammation/restriction, improving soft tissue extensibility and allowing for proper ROM for normal function with self care, mobility, lifting and ambulation. Modalities:     [x] GAME READY (VASO)- for significant edema, swelling, pain control. x15'    Charges:  Timed Code Treatment Minutes: 45'   Total Treatment Minutes: 48'         [] EVAL (LOW) 54223   [] EVAL (MOD) 52278  [] EVAL (HIGH) 34628   [] RE-EVAL     [x] LA(64672) x  2   [] IONTO  [] NMR (92796) x     [x] VASO  [x] Manual (68390) x 1     [] Other:  [] TA x      [] Mech Traction (02543)  [] ES(attended) (37990)      [] ES (un) (47318):       GOALS:   Patient stated goal: Patient will be able to walk without L knee pain and limited motion. [] Progressing: [] Met: [] Not Met: [] Adjusted    Therapist goals for Patient:   Short Term Goals: To be achieved in: 2 weeks  1. Independent in HEP and progression per patient tolerance, in order to prevent re-injury. [] Progressing: [] Met: [] Not Met: [] Adjusted  2.  Patient will have a decrease in pain to facilitate improvement in movement, function, and ADLs as indicated by Functional Deficits. [] Progressing: [] Met: [] Not Met: [] Adjusted    Long Term Goals: To be achieved in: 6 weeks  1. Disability index score of 14% or less for the LEFS to assist with reaching prior level of function. [] Progressing: [] Met: [] Not Met: [] Adjusted  2. Patient will demonstrate increased AROM to L KF to 125 deg and L KE to 0 deg to allow for proper joint functioning as indicated by patients Functional Deficits. [] Progressing: [] Met: [] Not Met: [] Adjusted  3. Patient will demonstrate an increase in Strength to good proximal hip strength and control, within 5lb HHD in LE to allow for proper functional mobility as indicated by patients Functional Deficits. [] Progressing: [] Met: [] Not Met: [] Adjusted  4. Patient will return to 95% of functional activities without increased symptoms or restriction. [] Progressing: [] Met: [] Not Met: [] Adjusted  5. Patient will be able to return to golf and hiking without L knee pain and limited motion(patient specific functional goal)    [] Progressing: [] Met: [] Not Met: [] Adjusted       Progression Towards Functional goals:  [] Patient is progressing as expected towards functional goals listed. [] Progression is slowed due to complexities listed. [] Progression has been slowed due to co-morbidities. [x] Plan just implemented, too soon to assess goals progression  [] Other:         Overall Progression Towards Functional goals/ Treatment Progress Update:  [] Patient is progressing as expected towards functional goals listed. [] Progression is slowed due to complexities/Impairments listed. [] Progression has been slowed due to co-morbidities.   [x] Plan just implemented, too soon to assess goals progression <30days   [] Goals require adjustment due to lack of progress  [] Patient is not progressing as expected and requires additional follow up with physician  [] Other    Prognosis for POC: [x] Good [] Fair  [] Poor      Patient requires continued skilled intervention: [x] Yes  [] No    Treatment/Activity Tolerance:  [x] Patient able to complete treatment  [] Patient limited by fatigue  [] Patient limited by pain    [] Patient limited by other medical complications  [] Other:     ASSESSMENT: Patient is doing well with improving ROM into KF and good quad strength with only mild lag noted. He did well with additional challenges this session. Patient would benefit from continued skilled PT in order to progress towards full return to functional mobility activities without limitations. Return to Play: (if applicable)   []  Stage 1: Intro to Strength   []  Stage 2: Return to Run and Strength   []  Stage 3: Return to Jump and Strength   []  Stage 4: Dynamic Strength and Agility   []  Stage 5: Sport Specific Training     []  Ready to Return to Play, Meets All Above Stages   []  Not Ready for Return to Sports   Comments:                               PLAN: Cont L quad strength and L knee ROM. [x] Continue per plan of care [] Alter current plan (see comments above)  [] Plan of care initiated [] Hold pending MD visit [] Discharge      Electronically signed by:  Titus Cates, PT, DPT, [facility] [id number]     Note: If patient does not return for scheduled/ recommended follow up visits, this note will serve as a discharge from care along with most recent update on progress.

## 2021-01-22 ENCOUNTER — HOSPITAL ENCOUNTER (OUTPATIENT)
Dept: PHYSICAL THERAPY | Age: 61
Setting detail: THERAPIES SERIES
Discharge: HOME | End: 2021-01-22
Payer: COMMERCIAL

## 2021-01-22 PROCEDURE — 97140 MANUAL THERAPY 1/> REGIONS: CPT

## 2021-01-22 PROCEDURE — 97110 THERAPEUTIC EXERCISES: CPT

## 2021-01-22 PROCEDURE — 97016 VASOPNEUMATIC DEVICE THERAPY: CPT

## 2021-01-22 NOTE — FLOWSHEET NOTE
disability  NT   ROM L KF 65 deg  92 deg      L KE Lacking 15 deg Lacking 5 deg                                     STRENGHT (MMT) L quad Good; 10 SLR w mild quad lag                                                           RESTRICTIONS/PRECAUTIONS: S/p L TKR    Exercises/Interventions:     Medbridge: [email or web address]    Therapeutic Ex (81072) Sets/sec Reps Notes/CUES   Seated HS stretch 30\"x3     Seated gastroc stretch 30\"x3     Quad set 5'Hx10     SLR 10x 2, 3\"H     Supine heel slides 10\"x10           SB KF stretch w strap  30\"x3     2x10     2x10, 3\"H     Bridges 2x15     Clamshells R/L 2x15 ea           KE prolonged stretch on 1/2 foam roll 5'  5#         Pt education on current condition, POC, expectations for therapy, and HEP             Manual Intervention (18941)      effleurage massage for swelling, PFJ mobs, PROM and gentle stretching of L knee 10'                                   NMR re-education (73221)   CUES NEEDED   Gait retraining with and without standard walker 4'                                                     Therapeutic Activity (12869)                                                Therapeutic Exercise and NMR EXR  [x] (15114) Provided verbal/tactile cueing for activities related to strengthening, flexibility, endurance, ROM for improvements in LE, proximal hip, and core control with self care, mobility, lifting, ambulation.  [] (50688) Provided verbal/tactile cueing for activities related to improving balance, coordination, kinesthetic sense, posture, motor skill, proprioception  to assist with LE, proximal hip, and core control in self care, mobility, lifting, ambulation and eccentric single leg control.      NMR and Therapeutic Activities:    [] (15587 or 60965) Provided verbal/tactile cueing for activities related to improving balance, coordination, kinesthetic sense, posture, motor skill, proprioception and motor activation to allow for proper function of core, proximal hip and LE with self in order to prevent re-injury. [] Progressing: [] Met: [] Not Met: [] Adjusted  2. Patient will have a decrease in pain to facilitate improvement in movement, function, and ADLs as indicated by Functional Deficits. [] Progressing: [] Met: [] Not Met: [] Adjusted    Long Term Goals: To be achieved in: 6 weeks  1. Disability index score of 14% or less for the LEFS to assist with reaching prior level of function. [] Progressing: [] Met: [] Not Met: [] Adjusted  2. Patient will demonstrate increased AROM to L KF to 125 deg and L KE to 0 deg to allow for proper joint functioning as indicated by patients Functional Deficits. [] Progressing: [] Met: [] Not Met: [] Adjusted  3. Patient will demonstrate an increase in Strength to good proximal hip strength and control, within 5lb HHD in LE to allow for proper functional mobility as indicated by patients Functional Deficits. [] Progressing: [] Met: [] Not Met: [] Adjusted  4. Patient will return to 95% of functional activities without increased symptoms or restriction. [] Progressing: [] Met: [] Not Met: [] Adjusted  5. Patient will be able to return to golf and hiking without L knee pain and limited motion(patient specific functional goal)    [] Progressing: [] Met: [] Not Met: [] Adjusted       Progression Towards Functional goals:  [] Patient is progressing as expected towards functional goals listed. [] Progression is slowed due to complexities listed. [] Progression has been slowed due to co-morbidities. [x] Plan just implemented, too soon to assess goals progression  [] Other:         Overall Progression Towards Functional goals/ Treatment Progress Update:  [] Patient is progressing as expected towards functional goals listed. [] Progression is slowed due to complexities/Impairments listed. [] Progression has been slowed due to co-morbidities.   [x] Plan just implemented, too soon to assess goals progression <30days   [] Goals require adjustment due to lack of progress  [] Patient is not progressing as expected and requires additional follow up with physician  [] Other    Prognosis for POC: [x] Good [] Fair  [] Poor      Patient requires continued skilled intervention: [x] Yes  [] No    Treatment/Activity Tolerance:  [x] Patient able to complete treatment  [] Patient limited by fatigue  [] Patient limited by pain    [] Patient limited by other medical complications  [] Other:     ASSESSMENT: Patient is doing well with improving ROM into KF and good quad strength with only mild lag noted. He achieved 92 deg of KF and is now lacking 5 deg of KE. He was provided updated HEP this date. Patient would benefit from continued skilled PT in order to progress towards full return to functional mobility activities without limitations. Return to Play: (if applicable)   []  Stage 1: Intro to Strength   []  Stage 2: Return to Run and Strength   []  Stage 3: Return to Jump and Strength   []  Stage 4: Dynamic Strength and Agility   []  Stage 5: Sport Specific Training     []  Ready to Return to Play, Meets All Above Stages   []  Not Ready for Return to Sports   Comments:                               PLAN: Cont L quad strength and L knee ROM. [x] Continue per plan of care [] Alter current plan (see comments above)  [] Plan of care initiated [] Hold pending MD visit [] Discharge      Electronically signed by:  Kehinde Hurtado, PT, DPT, OCS [id number]     Note: If patient does not return for scheduled/ recommended follow up visits, this note will serve as a discharge from care along with most recent update on progress.

## 2021-01-26 ENCOUNTER — HOSPITAL ENCOUNTER (OUTPATIENT)
Dept: PHYSICAL THERAPY | Age: 61
Setting detail: THERAPIES SERIES
Discharge: HOME | End: 2021-01-26
Payer: COMMERCIAL

## 2021-01-26 PROCEDURE — 97140 MANUAL THERAPY 1/> REGIONS: CPT

## 2021-01-26 PROCEDURE — 97110 THERAPEUTIC EXERCISES: CPT

## 2021-01-26 PROCEDURE — 97112 NEUROMUSCULAR REEDUCATION: CPT

## 2021-01-26 PROCEDURE — 97016 VASOPNEUMATIC DEVICE THERAPY: CPT

## 2021-01-26 NOTE — FLOWSHEET NOTE
[facility], [address] [place] [address]  Phone: [phone]  Fax [phone]    Physical Therapy Treatment Note/ Progress Report:           Date:  2021    Patient Name:  Blanca Newton    :  1960  MRN: [id number]  Restrictions/Precautions:    Medical/Treatment Diagnosis Information:  · Diagnosis: M25.562 Left knee pain; M17.12 Primary OA of the Left knee  · Treatment Diagnosis: M25.562 Left knee pain; M62.82 Left muscle weakness  Insurance/Certification information:  PT Insurance Information: Kevin Wise  Physician Information:  Referring Practitioner: Ashish Walton MD  Has the plan of care been signed (Y/N):        []  Yes  [x]  No     Date of Patient follow up with Physician: 21      Is this a Progress Report:     []  Yes  [x]  No        If Yes:  Date Range for reporting period:  Beginning 1/15/21  Ending     Progress report will be due (10 Rx or 30 days whichever is less):        Recertification will be due (POC Duration  / 90 days whichever is less): 4/15/21      Visit # Insurance Allowable Auth Required   In-person 4 20 [x]  Yes []  No    Telehealth     []  Yes []  No    Total          Functional Scale: LEFS 58 (28% disability)     Date assessed:  1/15/21      Therapy Diagnosis/Practice Pattern: H     Number of Comorbidities:  []0     [x]1-2    []3+     Latex Allergy:  [x]NO      []YES  Preferred Language for Healthcare:   [x]English       []other:      Pain level:  0/10 no pain medications taken. SUBJECTIVE:  Patient notes no pain in his knee and is now using his recumbent bike at home for ROM of his knee.       OBJECTIVE: See eval   Observation: bruising along incision, mild bruising proximally likely from tourniquet; ambulating with Hurrycane this date       Test measurements:       PT Practice Pattern:   Co morbidities:   surgical   INITIAL VISIT  CURRENT VISIT  21   PAIN  0/10 0/10 FUNCTIONAL SCALE LEFS 58/80; 28% disability  NT   ROM L KF 65 deg  110 deg      L KE Lacking 15 deg Lacking 4 deg                                     STRENGHT (MMT) L quad Good; 10 SLR w mild quad lag                                                           RESTRICTIONS/PRECAUTIONS: S/p L TKR    Exercises/Interventions:     Medbridge: [id number]    Therapeutic Ex (43663) Sets/sec Reps Notes/CUES   30\"x3     30\"x3     Quad set 5'Hx10     SLR 20x, 3\"H     Supine heel slides 10\"x10           SB KF stretch w strap  30\"x3  Achieved 110 deg following   2x10     2x10, 3\"H     Bridges 2x15     Clamshells R/L 2x15 ea           KE prolonged stretch on 1/2 foam roll 5'  5#         Slant board calf stretch 30\"x3     trustretch HS stretch 30\"x3           JACEY: HAB, TKE 2x15 ea  45#               Pt education on current condition, POC, expectations for therapy, and HEP             Manual Intervention (96627)      PFJ mobs, PROM and gentle stretching of L knee 10'                                   NMR re-education (72985)   CUES NEEDED   Gait retraining with and without standard walker 5'           Tandem balance shoulder ext R/L 15x ea     [facility] walking for L KF, heel-toe mechanics 3'                                   Therapeutic Activity (88048)                                                Therapeutic Exercise and NMR EXR  [x] (72704) Provided verbal/tactile cueing for activities related to strengthening, flexibility, endurance, ROM for improvements in LE, proximal hip, and core control with self care, mobility, lifting, ambulation.  [] (84414) Provided verbal/tactile cueing for activities related to improving balance, coordination, kinesthetic sense, posture, motor skill, proprioception  to assist with LE, proximal hip, and core control in self care, mobility, lifting, ambulation and eccentric single leg control.      NMR and Therapeutic Activities:    [] (40486 or ) Provided verbal/tactile cueing for activities related to improving balance, coordination, kinesthetic sense, posture, motor skill, proprioception and motor activation to allow for proper function of core, proximal hip and LE with self care and ADLs  [] (38603) Gait Re-education- Provided training and instruction to the patient for proper LE, core and proximal hip recruitment and positioning and eccentric body weight control with ambulation re-education including up and down stairs     Home Exercise Program:    [x] (07477) Reviewed/Progressed HEP activities related to strengthening, flexibility, endurance, ROM of core, proximal hip and LE for functional self-care, mobility, lifting and ambulation/stair navigation   [] (28737)Reviewed/Progressed HEP activities related to improving balance, coordination, kinesthetic sense, posture, motor skill, proprioception of core, proximal hip and LE for self care, mobility, lifting, and ambulation/stair navigation      Manual Treatments:  PROM / STM / Oscillations-Mobs:  G-I, II, III, IV (PA's, Inf., Post.)  [x] (52264) Provided manual therapy to mobilize LE, proximal hip and/or LS spine soft tissue/joints for the purpose of modulating pain, promoting relaxation,  increasing ROM, reducing/eliminating soft tissue swelling/inflammation/restriction, improving soft tissue extensibility and allowing for proper ROM for normal function with self care, mobility, lifting and ambulation. Modalities:     [x] GAME READY (VASO)- for significant edema, swelling, pain control. x15'    Charges:  Timed Code Treatment Minutes: 39'   Total Treatment Minutes: 61'         [] EVAL (LOW) 25663   [] EVAL (MOD) 82463  [] EVAL (HIGH) 62916   [] RE-EVAL     [x] GF(09399) x  1   [] IONTO  [x] NMR (47953) x  1   [x] VASO  [x] Manual (56736) x 1     [] Other:  [] TA x      [] Mech Traction (33306)  [] ES(attended) (39219)      [] ES (un) (04456):       GOALS:   Patient stated goal: Patient will be able to walk without L knee pain and limited motion.    [] Progressing: [] Met: [] Not Met: [] Adjusted    Therapist goals for Patient:   Short Term Goals: To be achieved in: 2 weeks  1. Independent in HEP and progression per patient tolerance, in order to prevent re-injury. [] Progressing: [] Met: [] Not Met: [] Adjusted  2. Patient will have a decrease in pain to facilitate improvement in movement, function, and ADLs as indicated by Functional Deficits. [] Progressing: [] Met: [] Not Met: [] Adjusted    Long Term Goals: To be achieved in: 6 weeks  1. Disability index score of 14% or less for the LEFS to assist with reaching prior level of function. [] Progressing: [] Met: [] Not Met: [] Adjusted  2. Patient will demonstrate increased AROM to L KF to 125 deg and L KE to 0 deg to allow for proper joint functioning as indicated by patients Functional Deficits. [] Progressing: [] Met: [] Not Met: [] Adjusted  3. Patient will demonstrate an increase in Strength to good proximal hip strength and control, within 5lb HHD in LE to allow for proper functional mobility as indicated by patients Functional Deficits. [] Progressing: [] Met: [] Not Met: [] Adjusted  4. Patient will return to 95% of functional activities without increased symptoms or restriction. [] Progressing: [] Met: [] Not Met: [] Adjusted  5. Patient will be able to return to golf and hiking without L knee pain and limited motion(patient specific functional goal)    [] Progressing: [] Met: [] Not Met: [] Adjusted       Progression Towards Functional goals:  [] Patient is progressing as expected towards functional goals listed. [] Progression is slowed due to complexities listed. [] Progression has been slowed due to co-morbidities. [x] Plan just implemented, too soon to assess goals progression  [] Other:         Overall Progression Towards Functional goals/ Treatment Progress Update:  [] Patient is progressing as expected towards functional goals listed.     [] Progression is slowed due to complexities/Impairments listed. [] Progression has been slowed due to co-morbidities. [x] Plan just implemented, too soon to assess goals progression <30days   [] Goals require adjustment due to lack of progress  [] Patient is not progressing as expected and requires additional follow up with physician  [] Other    Prognosis for POC: [x] Good [] Fair  [] Poor      Patient requires continued skilled intervention: [x] Yes  [] No    Treatment/Activity Tolerance:  [x] Patient able to complete treatment  [] Patient limited by fatigue  [] Patient limited by pain    [] Patient limited by other medical complications  [] Other:     ASSESSMENT: Patient is doing well with improving ROM into KF and good quad strength. He is now ambulating with hurrycane and is demonstrating improving gait mechanics with improving symmetry of step length L vs. R. Additional exercises for hip strengthening were well tolerated without pain. Patient would benefit from continued skilled PT in order to progress towards full return to functional mobility activities without limitations. Return to Play: (if applicable)   []  Stage 1: Intro to Strength   []  Stage 2: Return to Run and Strength   []  Stage 3: Return to Jump and Strength   []  Stage 4: Dynamic Strength and Agility   []  Stage 5: Sport Specific Training     []  Ready to Return to Play, Meets All Above Stages   []  Not Ready for Return to Sports   Comments:                               PLAN: Cont L quad strength and L knee ROM. [x] Continue per plan of care [] Alter current plan (see comments above)  [] Plan of care initiated [] Hold pending MD visit [] Discharge      Electronically signed by:  Remedios Olguin, PT, DPT, [facility] [id number]     Note: If patient does not return for scheduled/ recommended follow up visits, this note will serve as a discharge from care along with most recent update on progress.

## 2021-01-29 ENCOUNTER — OFFICE VISIT (OUTPATIENT)
Dept: ORTHOPEDIC SURGERY | Age: 61
End: 2021-01-29

## 2021-01-29 ENCOUNTER — HOSPITAL ENCOUNTER (OUTPATIENT)
Dept: PHYSICAL THERAPY | Age: 61
Setting detail: THERAPIES SERIES
Discharge: HOME | End: 2021-01-29
Payer: COMMERCIAL

## 2021-01-29 DIAGNOSIS — Z96.652 STATUS POST TOTAL KNEE REPLACEMENT, LEFT: Primary | ICD-10-CM

## 2021-01-29 PROCEDURE — 97016 VASOPNEUMATIC DEVICE THERAPY: CPT

## 2021-01-29 PROCEDURE — 99024 POSTOP FOLLOW-UP VISIT: CPT | Performed by: PHYSICIAN ASSISTANT

## 2021-01-29 PROCEDURE — 97110 THERAPEUTIC EXERCISES: CPT

## 2021-01-29 PROCEDURE — 97140 MANUAL THERAPY 1/> REGIONS: CPT

## 2021-01-29 PROCEDURE — 97112 NEUROMUSCULAR REEDUCATION: CPT

## 2021-01-29 NOTE — PROGRESS NOTES
Diagnosis: Status post left total knee replacement    History of present illness: The patient returns today following total knee replacement. The pain has been well controlled on oral analgesics. They have no complaints of constitutional symptoms. Pain Assessment  Location of Pain: Knee  Location Modifiers: Left  Severity of Pain: 0]    Physical exam: The incision is clean, dry and intact with no drainage. Range of motion is 0 degrees of extension to 110 degrees of flexion. There is expected swelling with no evidence of DVT and a negative Maik's sign. Neruovascular exam is intact. X-rays were ordered today and reviewed of the left knee. Standing AP, lateral, and skyline views. They demonstrate a left total knee arthroplasty in good position. No evidence of loosening or periprosthetic fracture. Assessment/Plan: We would like to begin outpatient physical therapy to restore range of motion and strength. The patient was given local wound care instructions. We did not refill their pain medicaion today. We will follow up in 3-4 weeks for re-evaluation.

## 2021-01-29 NOTE — FLOWSHEET NOTE
[facility],  [address] [place]  Phone: [phone]  Fax [phone]    Physical Therapy Treatment Note/ Progress Report:           Date:  2021    Patient Name:  Corine Garcia    :  1960  MRN: [id number]  Restrictions/Precautions:    Medical/Treatment Diagnosis Information:  · Diagnosis: M25.562 Left knee pain; M17.12 Primary OA of the Left knee  · Treatment Diagnosis: M25.562 Left knee pain; M62.82 Left muscle weakness  Insurance/Certification information:  PT Insurance Information: Brenda Thompson  Physician Information:  Referring Practitioner: Sanjuana Unger MD  Has the plan of care been signed (Y/N):        []  Yes  [x]  No     Date of Patient follow up with Physician: 21      Is this a Progress Report:     []  Yes  [x]  No        If Yes:  Date Range for reporting period:  Beginning 1/15/21  Ending     Progress report will be due (10 Rx or 30 days whichever is less):        Recertification will be due (POC Duration  / 90 days whichever is less): 4/15/21      Visit # Insurance Allowable Auth Required   In-person 5 20 [x]  Yes []  No    Telehealth     []  Yes []  No    Total          Functional Scale: LEFS 58 (28% disability)     Date assessed:  1/15/21      Therapy Diagnosis/Practice Pattern: H     Number of Comorbidities:  []0     [x]1-2    []3+     Latex Allergy:  [x]NO      []YES  Preferred Language for Healthcare:   [x]English       []other:      Pain level:  0/10 no pain medications taken. SUBJECTIVE:  Patient reports no issues with his knee or HEP. OBJECTIVE: See eval  ? Observation: bruising along incision, mild bruising proximally likely from tourniquet; ambulating with Hurrycane this date      ?  Test measurements:       PT Practice Pattern:   Co morbidities:   surgical   INITIAL VISIT  CURRENT VISIT  21   PAIN  0/10 0/10    FUNCTIONAL SCALE LEFS 58/80; 28% disability  NT ROM L KF 65 deg  110 deg      L KE Lacking 15 deg Lacking 4 deg                                     STRENGHT (MMT) L quad Good; 10 SLR w mild quad lag                                                           RESTRICTIONS/PRECAUTIONS: S/p L TKR    Exercises/Interventions:     Medchi: [id number]    Therapeutic Ex (70285) Sets/sec Reps Notes/CUES   30\"x3     30\"x3     Quad set 5'Hx10     SLR 20x, 3\"H  2#    10\"x10           SB KF stretch w strap  30\"x3  Achieved 110 deg following   2x10     2x10, 3\"H     2x15     2x15 ea           KE prolonged stretch on 1/2 foam roll 5'  5#         Slant board calf stretch 30\"x3     trustretch HS stretch 30\"x3     Trustretch KF stretch 30\"x3     JACEY: HAB, TKE 2x15 ea  60#         Seated LAQ  2x15, 3\"H  2#                           Pt education on current condition, POC, expectations for therapy, and HEP             Manual Intervention (86071)      PFJ mobs, PROM and KF stretching of L knee 8'     IASTM to L quads, VL, ITB 3'                             NMR re-education (57524)   CUES NEEDED   Gait retraining without AD 3'           Tandem balance shoulder ext R/L 15x ea     [facility] walking for L KF, heel-toe mechanics 3'     Step ups R/L 6\" 20x 3\"H                             Therapeutic Activity (30631)                                                Therapeutic Exercise and NMR EXR  [x] (79207) Provided verbal/tactile cueing for activities related to strengthening, flexibility, endurance, ROM for improvements in LE, proximal hip, and core control with self care, mobility, lifting, ambulation.  [] (94916) Provided verbal/tactile cueing for activities related to improving balance, coordination, kinesthetic sense, posture, motor skill, proprioception  to assist with LE, proximal hip, and core control in self care, mobility, lifting, ambulation and eccentric single leg control.      NMR and Therapeutic Activities: [] (03771 or 65484) Provided verbal/tactile cueing for activities related to improving balance, coordination, kinesthetic sense, posture, motor skill, proprioception and motor activation to allow for proper function of core, proximal hip and LE with self care and ADLs  [] (08196) Gait Re-education- Provided training and instruction to the patient for proper LE, core and proximal hip recruitment and positioning and eccentric body weight control with ambulation re-education including up and down stairs     Home Exercise Program:    [x] (92455) Reviewed/Progressed HEP activities related to strengthening, flexibility, endurance, ROM of core, proximal hip and LE for functional self-care, mobility, lifting and ambulation/stair navigation   [] (09871)Reviewed/Progressed HEP activities related to improving balance, coordination, kinesthetic sense, posture, motor skill, proprioception of core, proximal hip and LE for self care, mobility, lifting, and ambulation/stair navigation      Manual Treatments:  PROM / STM / Oscillations-Mobs:  G-I, II, III, IV (PA's, Inf., Post.)  [x] (09029) Provided manual therapy to mobilize LE, proximal hip and/or LS spine soft tissue/joints for the purpose of modulating pain, promoting relaxation,  increasing ROM, reducing/eliminating soft tissue swelling/inflammation/restriction, improving soft tissue extensibility and allowing for proper ROM for normal function with self care, mobility, lifting and ambulation. Modalities:     [x] GAME READY (VASO)- for significant edema, swelling, pain control.  x10'    Charges:  Timed Code Treatment Minutes: 40'   Total Treatment Minutes: 54'         [] EVAL (LOW) 455 1011   [] EVAL (MOD) 02504  [] EVAL (HIGH) 57661   [] RE-EVAL     [x] WD(18007) x  1   [] IONTO  [x] NMR (93386) x  1   [x] VASO  [x] Manual (23198) x 1     [] Other:  [] TA x      [] Mech Traction (72361)  [] ES(attended) (00997)      [] ES (un) (87962):       GOALS: Patient stated goal: Patient will be able to walk without L knee pain and limited motion. [] Progressing: [] Met: [] Not Met: [] Adjusted    Therapist goals for Patient:   Short Term Goals: To be achieved in: 2 weeks  1. Independent in HEP and progression per patient tolerance, in order to prevent re-injury. [] Progressing: [] Met: [] Not Met: [] Adjusted  2. Patient will have a decrease in pain to facilitate improvement in movement, function, and ADLs as indicated by Functional Deficits. [] Progressing: [] Met: [] Not Met: [] Adjusted    Long Term Goals: To be achieved in: 6 weeks  1. Disability index score of 14% or less for the LEFS to assist with reaching prior level of function. [] Progressing: [] Met: [] Not Met: [] Adjusted  2. Patient will demonstrate increased AROM to L KF to 125 deg and L KE to 0 deg to allow for proper joint functioning as indicated by patients Functional Deficits. [] Progressing: [] Met: [] Not Met: [] Adjusted  3. Patient will demonstrate an increase in Strength to good proximal hip strength and control, within 5lb HHD in LE to allow for proper functional mobility as indicated by patients Functional Deficits. [] Progressing: [] Met: [] Not Met: [] Adjusted  4. Patient will return to 95% of functional activities without increased symptoms or restriction. [] Progressing: [] Met: [] Not Met: [] Adjusted  5. Patient will be able to return to golf and hiking without L knee pain and limited motion(patient specific functional goal)    [] Progressing: [] Met: [] Not Met: [] Adjusted       Progression Towards Functional goals:  [] Patient is progressing as expected towards functional goals listed. [] Progression is slowed due to complexities listed. [] Progression has been slowed due to co-morbidities.   [x] Plan just implemented, too soon to assess goals progression  [] Other:         Overall Progression Towards Functional goals/ Treatment Progress Update: [] Patient is progressing as expected towards functional goals listed. [] Progression is slowed due to complexities/Impairments listed. [] Progression has been slowed due to co-morbidities. [x] Plan just implemented, too soon to assess goals progression <30days   [] Goals require adjustment due to lack of progress  [] Patient is not progressing as expected and requires additional follow up with physician  [] Other    Prognosis for POC: [x] Good [] Fair  [] Poor      Patient requires continued skilled intervention: [x] Yes  [] No    Treatment/Activity Tolerance:  [x] Patient able to complete treatment  [] Patient limited by fatigue  [] Patient limited by pain    [] Patient limited by other medical complications  [] Other:     ASSESSMENT: Patient is doing well with improving ROM into KF and good quad strength. He is now ambulating with and occasionally without hurrycane for short distances without issue however he was educated by PT to ensure he takes his AD with him when outside of the home and to use the HR when negotiating stairs. Lluvia Luis He did well with tolerating added resistance for quad exercises today and functional strengthening. Patient would benefit from continued skilled PT in order to progress towards full return to functional mobility activities without limitations. Return to Play: (if applicable)   []  Stage 1: Intro to Strength   []  Stage 2: Return to Run and Strength   []  Stage 3: Return to Jump and Strength   []  Stage 4: Dynamic Strength and Agility   []  Stage 5: Sport Specific Training     []  Ready to Return to Play, Meets All Above Stages   []  Not Ready for Return to Sports   Comments:                               PLAN: Cont L quad strength and L knee ROM.    [x] Continue per plan of care [] Alter current plan (see comments above)  [] Plan of care initiated [] Hold pending MD visit [] Discharge      Electronically signed by:  Gypsy Maldonado, PT, DPT, [id number] [address] [id number] Note: If patient does not return for scheduled/ recommended follow up visits, this note will serve as a discharge from care along with most recent update on progress.

## 2021-02-02 ENCOUNTER — HOSPITAL ENCOUNTER (OUTPATIENT)
Dept: PHYSICAL THERAPY | Age: 61
Setting detail: THERAPIES SERIES
Discharge: HOME | End: 2021-02-02
Payer: COMMERCIAL

## 2021-02-02 PROCEDURE — 97112 NEUROMUSCULAR REEDUCATION: CPT

## 2021-02-02 PROCEDURE — 97016 VASOPNEUMATIC DEVICE THERAPY: CPT

## 2021-02-02 PROCEDURE — 97140 MANUAL THERAPY 1/> REGIONS: CPT

## 2021-02-02 PROCEDURE — 97110 THERAPEUTIC EXERCISES: CPT

## 2021-02-02 NOTE — FLOWSHEET NOTE
[facility], [address] [place]  Phone: [phone]  Fax [phone]    Physical Therapy Treatment Note/ Progress Report:           Date:  2021    Patient Name:  Vijay Welch    :  1960  MRN: [id number]  Restrictions/Precautions:    Medical/Treatment Diagnosis Information:  · Diagnosis: M25.562 Left knee pain; M17.12 Primary OA of the Left knee  · Treatment Diagnosis: M25.562 Left knee pain; M62.82 Left muscle weakness  Insurance/Certification information:  PT Insurance Information: Pierre Gooden  Physician Information:  Referring Practitioner: Heydi Martinez MD  Has the plan of care been signed (Y/N):        []  Yes  [x]  No     Date of Patient follow up with Physician: 21      Is this a Progress Report:     []  Yes  [x]  No        If Yes:  Date Range for reporting period:  Beginning 1/15/21  Ending     Progress report will be due (10 Rx or 30 days whichever is less):        Recertification will be due (POC Duration  / 90 days whichever is less): 4/15/21      Visit # Insurance Allowable Auth Required   In-person 6 9 approved visits [x]  Yes []  No    Telehealth     []  Yes []  No    Total          Functional Scale: LEFS 58 (28% disability)     Date assessed:  1/15/21      Therapy Diagnosis/Practice Pattern: H     Number of Comorbidities:  []0     [x]1-2    []3+     Latex Allergy:  [x]NO      []YES  Preferred Language for Healthcare:   [x]English       []other:      Pain level:  0/10 no pain medications taken. SUBJECTIVE:  Patient notes he has no pain in his knee and is primarily having to work through the bending in his knee. OBJECTIVE: See eval  ? Observation: ambulating without AD, improved symmetry in stance time L vs. R      ?  Test measurements:       PT Practice Pattern:   Co morbidities:   surgical   INITIAL VISIT  CURRENT VISIT  21   PAIN  0/10 0/10 FUNCTIONAL SCALE LEFS 58/80; 28% disability  NT   ROM L KF 65 deg  110 deg      L KE Lacking 15 deg Lacking 4 deg                                     STRENGHT (MMT) L quad Good; 10 SLR w mild quad lag                                                           RESTRICTIONS/PRECAUTIONS: S/p L TKR    Exercises/Interventions:     [employer]: [id number]    Therapeutic Ex (29709) Sets/sec Reps Notes/CUES   30\"x3     30\"x3     Quad set 5'Hx10     SLR 30x, 3\"H  2#    10\"x10           SB KF stretch w strap  30\"x3  Achieved 110 deg following   2x10     2x10, 3\"H     2x15     2x15 ea           KE prolonged stretch on 1/2 foam roll 5'  5#         Slant board calf stretch 30\"x3     trustretch HS stretch 30\"x3     Trustretch KF stretch 30\"x3     JACEY: HAB, TKE 2x15 ea  60#         Seated LAQ  2x15, 3\"H  2#   LP: DL 2x15  #          2up, 1 down 2x15  80#               Pt education on current condition, POC, expectations for therapy, and HEP             Manual Intervention ([phone])      PFJ mobs, PROM and KF stretching of L knee 8'     IASTM to L quads, VL, ITB 3'                             NMR re-education (11398)   CUES NEEDED   Gait retraining without AD 3'           15x ea     3'     20x 3\"H           CC retro walking 7x 4 steps  30#               Therapeutic Activity (06276)                                                Therapeutic Exercise and NMR EXR  [x] (21772) Provided verbal/tactile cueing for activities related to strengthening, flexibility, endurance, ROM for improvements in LE, proximal hip, and core control with self care, mobility, lifting, ambulation.  [] (50646) Provided verbal/tactile cueing for activities related to improving balance, coordination, kinesthetic sense, posture, motor skill, proprioception  to assist with LE, proximal hip, and core control in self care, mobility, lifting, ambulation and eccentric single leg control.      NMR and Therapeutic Activities: Patient stated goal: Patient will be able to walk without L knee pain and limited motion. [] Progressing: [] Met: [] Not Met: [] Adjusted    Therapist goals for Patient:   Short Term Goals: To be achieved in: 2 weeks  1. Independent in HEP and progression per patient tolerance, in order to prevent re-injury. [] Progressing: [] Met: [] Not Met: [] Adjusted  2. Patient will have a decrease in pain to facilitate improvement in movement, function, and ADLs as indicated by Functional Deficits. [] Progressing: [] Met: [] Not Met: [] Adjusted    Long Term Goals: To be achieved in: 6 weeks  1. Disability index score of 14% or less for the LEFS to assist with reaching prior level of function. [] Progressing: [] Met: [] Not Met: [] Adjusted  2. Patient will demonstrate increased AROM to L KF to 125 deg and L KE to 0 deg to allow for proper joint functioning as indicated by patients Functional Deficits. [] Progressing: [] Met: [] Not Met: [] Adjusted  3. Patient will demonstrate an increase in Strength to good proximal hip strength and control, within 5lb HHD in LE to allow for proper functional mobility as indicated by patients Functional Deficits. [] Progressing: [] Met: [] Not Met: [] Adjusted  4. Patient will return to 95% of functional activities without increased symptoms or restriction. [] Progressing: [] Met: [] Not Met: [] Adjusted  5. Patient will be able to return to golf and hiking without L knee pain and limited motion(patient specific functional goal)    [] Progressing: [] Met: [] Not Met: [] Adjusted       Progression Towards Functional goals:  [] Patient is progressing as expected towards functional goals listed. [] Progression is slowed due to complexities listed. [] Progression has been slowed due to co-morbidities.   [x] Plan just implemented, too soon to assess goals progression  [] Other:         Overall Progression Towards Functional goals/ Treatment Progress Update: [] Patient is progressing as expected towards functional goals listed. [] Progression is slowed due to complexities/Impairments listed. [] Progression has been slowed due to co-morbidities. [x] Plan just implemented, too soon to assess goals progression <30days   [] Goals require adjustment due to lack of progress  [] Patient is not progressing as expected and requires additional follow up with physician  [] Other    Prognosis for POC: [x] Good [] Fair  [] Poor      Patient requires continued skilled intervention: [x] Yes  [] No    Treatment/Activity Tolerance:  [x] Patient able to complete treatment  [] Patient limited by fatigue  [] Patient limited by pain    [] Patient limited by other medical complications  [] Other:     ASSESSMENT: Patient is doing well with improving ROM into KF and good quad strength. He is no longer using AD during ambulation and is demonstrating improved gait mechanics. He did well with tolerating added resistance for quad exercises today and functional strengthening. Patient would benefit from continued skilled PT in order to progress towards full return to functional mobility activities without limitations. Return to Play: (if applicable)   []  Stage 1: Intro to Strength   []  Stage 2: Return to Run and Strength   []  Stage 3: Return to Jump and Strength   []  Stage 4: Dynamic Strength and Agility   []  Stage 5: Sport Specific Training     []  Ready to Return to Play, Meets All Above Stages   []  Not Ready for Return to Sports   Comments:                               PLAN: Cont L quad strength and L knee ROM.    [x] Continue per plan of care [] Alter current plan (see comments above)  [] Plan of care initiated [] Hold pending MD visit [] Discharge      Electronically signed by:  Kehinde Hurtado, PT, DPT, [id number] [address] [id number] Note: If patient does not return for scheduled/ recommended follow up visits, this note will serve as a discharge from care along with most recent update on progress.

## 2021-02-05 ENCOUNTER — HOSPITAL ENCOUNTER (OUTPATIENT)
Dept: PHYSICAL THERAPY | Age: 61
Setting detail: THERAPIES SERIES
Discharge: HOME | End: 2021-02-05
Payer: COMMERCIAL

## 2021-02-05 PROCEDURE — 97140 MANUAL THERAPY 1/> REGIONS: CPT

## 2021-02-05 PROCEDURE — 97110 THERAPEUTIC EXERCISES: CPT

## 2021-02-05 PROCEDURE — 97112 NEUROMUSCULAR REEDUCATION: CPT

## 2021-02-05 NOTE — FLOWSHEET NOTE
[facility], [address]  Phone: [phone]  Fax [phone]    Physical Therapy Treatment Note/ Progress Report:           Date:  2021    Patient Name:  Debra Jones    :  1960  MRN: [id number]  Restrictions/Precautions:    Medical/Treatment Diagnosis Information:  · Diagnosis: M25.562 Left knee pain; M17.12 Primary OA of the Left knee  · Treatment Diagnosis: M25.562 Left knee pain; M62.82 Left muscle weakness  Insurance/Certification information:  PT Insurance Information: Mar Lopez  Physician Information:  Referring Practitioner: Parker Yanez MD  Has the plan of care been signed (Y/N):        []  Yes  [x]  No     Date of Patient follow up with Physician: 21      Is this a Progress Report:     []  Yes  [x]  No        If Yes:  Date Range for reporting period:  Beginning 1/15/21  Ending     Progress report will be due (10 Rx or 30 days whichever is less):        Recertification will be due (POC Duration  / 90 days whichever is less): 4/15/21      Visit # Insurance Allowable Auth Required   In-person 7 9 approved visits [x]  Yes []  No    Telehealth     []  Yes []  No    Total          Functional Scale: LEFS 58 (28% disability)     Date assessed:  1/15/21      Therapy Diagnosis/Practice Pattern: H     Number of Comorbidities:  []0     [x]1-2    []3+     Latex Allergy:  [x]NO      []YES  Preferred Language for Healthcare:   [x]English       []other:      Pain level:  0/10 no pain medications taken. SUBJECTIVE:  Patient reports he had some calf cramping overnight and followed up with the PA who was not concerned about DVT. Patient was instructed to hydrate and stretching. Patient denies increased swelling, redness, and has since not had the cramping.        OBJECTIVE: See eval ? Observation: ambulating without AD, improved symmetry in stance time L vs. R; no redness, swelling, ttp of the L calf or knee     ? Test measurements:       PT Practice Pattern:   Co morbidities:   surgical   INITIAL VISIT  CURRENT VISIT  2/5/21   PAIN  0/10 0/10    FUNCTIONAL SCALE LEFS 58/80; 28% disability  NT   ROM L KF 65 deg  120 deg      L KE Lacking 15 deg Lacking 4 deg                                     STRENGHT (MMT) L quad Good; 10 SLR w mild quad lag                                                           RESTRICTIONS/PRECAUTIONS: S/p L TKR    Exercises/Interventions:     Ayannah: [email or web address]    Therapeutic Ex (30465) Sets/sec Reps Notes/CUES   30\"x3     30\"x3     Quad set 5'Hx10     SLR 30x, 3\"H  2#    10\"x10           SB KF stretch w strap  30\"x3  Achieved 110 deg following   2x10     2x10, 3\"H     2x15     2x15 ea           KE prolonged stretch on 1/2 foam roll 5'  5#         Slant board calf stretch 30\"x3     trustretch HS stretch 30\"x3     Trustretch KF stretch 30\"x3     2x15 ea  60#        2x15, 3\"H  2#   LP: DL 2x15  120#          2up, 1 down 2x15  100#   Sb squats 2x15  VC for equal WB   SLR pulses 3P, 2x15            Pt education on current condition, POC, expectations for therapy, and HEP             Manual Intervention (08957)      PFJ mobs, PROM and KF stretching of L knee 8'     Massage stick to L quads, VL, ITB, L HS, gastroc 5'                             NMR re-education (62759)   CUES NEEDED                5\"H, 15x ea     3'     20x 3\"H           CC retro walking 7x 4 steps  30#   STS on airex 10x 2  From low mat         Therapeutic Activity (06505)                                                Therapeutic Exercise and NMR EXR  [x] (41028) Provided verbal/tactile cueing for activities related to strengthening, flexibility, endurance, ROM for improvements in LE, proximal hip, and core control with self care, mobility, lifting, ambulation. [] (28721) Provided verbal/tactile cueing for activities related to improving balance, coordination, kinesthetic sense, posture, motor skill, proprioception  to assist with LE, proximal hip, and core control in self care, mobility, lifting, ambulation and eccentric single leg control. NMR and Therapeutic Activities:    [] (21326 or 82726) Provided verbal/tactile cueing for activities related to improving balance, coordination, kinesthetic sense, posture, motor skill, proprioception and motor activation to allow for proper function of core, proximal hip and LE with self care and ADLs  [] (38466) Gait Re-education- Provided training and instruction to the patient for proper LE, core and proximal hip recruitment and positioning and eccentric body weight control with ambulation re-education including up and down stairs     Home Exercise Program:    [x] (60338) Reviewed/Progressed HEP activities related to strengthening, flexibility, endurance, ROM of core, proximal hip and LE for functional self-care, mobility, lifting and ambulation/stair navigation   [] (74761)Reviewed/Progressed HEP activities related to improving balance, coordination, kinesthetic sense, posture, motor skill, proprioception of core, proximal hip and LE for self care, mobility, lifting, and ambulation/stair navigation      Manual Treatments:  PROM / STM / Oscillations-Mobs:  G-I, II, III, IV (PA's, Inf., Post.)  [x] (37159) Provided manual therapy to mobilize LE, proximal hip and/or LS spine soft tissue/joints for the purpose of modulating pain, promoting relaxation,  increasing ROM, reducing/eliminating soft tissue swelling/inflammation/restriction, improving soft tissue extensibility and allowing for proper ROM for normal function with self care, mobility, lifting and ambulation. Modalities:     [] GAME READY (VASO)- for significant edema, swelling, pain control.  x10'    Charges:  Timed Code Treatment Minutes: 48' Total Treatment Minutes: 50'         [] EVAL (LOW) 01027   [] EVAL (MOD) 93742  [] EVAL (HIGH) 47402   [] RE-EVAL     [x] IJ(86837) x  1   [] IONTO  [x] NMR (80423) x  1   [] VASO  [x] Manual (38065) x 1     [] Other:  [] TA x      [] Mech Traction (66035)  [] ES(attended) (63217)      [] ES (un) (39462):       GOALS:   Patient stated goal: Patient will be able to walk without L knee pain and limited motion. [] Progressing: [] Met: [] Not Met: [] Adjusted    Therapist goals for Patient:   Short Term Goals: To be achieved in: 2 weeks  1. Independent in HEP and progression per patient tolerance, in order to prevent re-injury. [] Progressing: [] Met: [] Not Met: [] Adjusted  2. Patient will have a decrease in pain to facilitate improvement in movement, function, and ADLs as indicated by Functional Deficits. [] Progressing: [] Met: [] Not Met: [] Adjusted    Long Term Goals: To be achieved in: 6 weeks  1. Disability index score of 14% or less for the LEFS to assist with reaching prior level of function. [] Progressing: [] Met: [] Not Met: [] Adjusted  2. Patient will demonstrate increased AROM to L KF to 125 deg and L KE to 0 deg to allow for proper joint functioning as indicated by patients Functional Deficits. [] Progressing: [] Met: [] Not Met: [] Adjusted  3. Patient will demonstrate an increase in Strength to good proximal hip strength and control, within 5lb HHD in LE to allow for proper functional mobility as indicated by patients Functional Deficits. [] Progressing: [] Met: [] Not Met: [] Adjusted  4. Patient will return to 95% of functional activities without increased symptoms or restriction. [] Progressing: [] Met: [] Not Met: [] Adjusted  5.  Patient will be able to return to golf and hiking without L knee pain and limited motion(patient specific functional goal)    [] Progressing: [] Met: [] Not Met: [] Adjusted       Progression Towards Functional goals: [x] Continue per plan of care [] Alter current plan (see comments above)  [] Plan of care initiated [] Hold pending MD visit [] Discharge      Electronically signed by:  Adali Krishna, PT, DPT, \[facility]\"" [id number]     Note: If patient does not return for scheduled/ recommended follow up visits, this note will serve as a discharge from care along with most recent update on progress.

## 2021-02-09 ENCOUNTER — HOSPITAL ENCOUNTER (OUTPATIENT)
Dept: PHYSICAL THERAPY | Age: 61
Setting detail: THERAPIES SERIES
Discharge: HOME | End: 2021-02-09
Payer: COMMERCIAL

## 2021-02-09 PROCEDURE — 97110 THERAPEUTIC EXERCISES: CPT

## 2021-02-09 PROCEDURE — 97140 MANUAL THERAPY 1/> REGIONS: CPT

## 2021-02-09 NOTE — FLOWSHEET NOTE
[facility],  [address]  Phone: [phone]  Fax [phone]    Physical Therapy Treatment Note/ Progress Report:           Date:  2021    Patient Name:  Goldie Navarrete    :  1960  MRN: [id number]  Restrictions/Precautions:    Medical/Treatment Diagnosis Information:  · Diagnosis: M25.562 Left knee pain; M17.12 Primary OA of the Left knee  · Treatment Diagnosis: M25.562 Left knee pain; M62.82 Left muscle weakness  Insurance/Certification information:  PT Insurance Information: Hell[employer] Jimenez  Physician Information:  Referring Practitioner: Kareen Guan MD  Has the plan of care been signed (Y/N):        []  Yes  [x]  No     Date of Patient follow up with Physician: 21      Is this a Progress Report:     []  Yes  [x]  No        If Yes:  Date Range for reporting period:  Beginning 1/15/21  Ending     Progress report will be due (10 Rx or 30 days whichever is less): 32       Recertification will be due (POC Duration  / 90 days whichever is less): 4/15/21      Visit # Insurance Allowable Auth Required   In-person 8 9 approved visits [x]  Yes []  No    Telehealth     []  Yes []  No    Total          Functional Scale: LEFS 58 (28% disability)     Date assessed:  1/15/21      Therapy Diagnosis/Practice Pattern: H     Number of Comorbidities:  []0     [x]1-2    []3+     Latex Allergy:  [x]NO      []YES  Preferred Language for Healthcare:   [x]English       []other:      Pain level:  0/10 no pain medications taken. SUBJECTIVE:  Patient reports no further episodes of calf cramping since his last session. He was even out today shoveling snow as carefully as he could and reports no issues. OBJECTIVE: See eval  ? Observation: ambulating without AD, improved symmetry in stance time L vs. R; no redness, swelling, ttp of the L calf or knee     ?  Test measurements:       PT Practice Pattern: Co morbidities:   surgical   INITIAL VISIT  CURRENT VISIT  2/9/21   PAIN  0/10 0/10    FUNCTIONAL SCALE LEFS 58/80; 28% disability  NT   ROM L KF 65 deg  120 deg      L KE Lacking 15 deg Lacking 2 deg                                     STRENGHT (MMT) L quad Good; 10 SLR w mild quad lag  Good +                                                         RESTRICTIONS/PRECAUTIONS: S/p L TKR    Exercises/Interventions:     Medbridge: [id number]    Therapeutic Ex (87108) Sets/sec Reps Notes/CUES   30\"x3     30\"x3     5'Hx10     30x, 3\"H  2#    10\"x10            30\"x3  Achieved 110 deg following   2x10     2x10, 3\"H     2x15     2x15 ea               5#         Slant board calf stretch 30\"x3     trustretch HS stretch 30\"x3     Trustretch KF stretch 30\"x3     2x15 ea  60#        2x15, 3\"H  2#   LP: DL 2x15  125#          2up, 1 down 2x15  125#   2x15  VC for equal WB   3P, 2x15            Leg ext 2x15  40#   Leg curls 2x15   35#   Seated DHR 2x15   40#   LBW OVL 15'x3     Monster walks 15'x3                 Pt education on current condition, POC, expectations for therapy, and HEP             Manual Intervention (89358)      PFJ mobs, PROM and KF stretching of L knee 8'     Massage stick to L quads, VL, ITB, L HS, gastroc 5'                             NMR re-education (77979)   CUES NEEDED                5\"H, 15x ea     3'     20x 3\"H           7x 4 steps  30#   10x 2  From low mat         Therapeutic Activity (65823)                                                Therapeutic Exercise and NMR EXR  [x] (59149) Provided verbal/tactile cueing for activities related to strengthening, flexibility, endurance, ROM for improvements in LE, proximal hip, and core control with self care, mobility, lifting, ambulation. [] (87252) Provided verbal/tactile cueing for activities related to improving balance, coordination, kinesthetic sense, posture, motor skill, proprioception  to assist with LE, proximal hip, and core control in self care, mobility, lifting, ambulation and eccentric single leg control. NMR and Therapeutic Activities:    [] (43552 or 65190) Provided verbal/tactile cueing for activities related to improving balance, coordination, kinesthetic sense, posture, motor skill, proprioception and motor activation to allow for proper function of core, proximal hip and LE with self care and ADLs  [] (90366) Gait Re-education- Provided training and instruction to the patient for proper LE, core and proximal hip recruitment and positioning and eccentric body weight control with ambulation re-education including up and down stairs     Home Exercise Program:    [x] (87952) Reviewed/Progressed HEP activities related to strengthening, flexibility, endurance, ROM of core, proximal hip and LE for functional self-care, mobility, lifting and ambulation/stair navigation   [] (00402)Reviewed/Progressed HEP activities related to improving balance, coordination, kinesthetic sense, posture, motor skill, proprioception of core, proximal hip and LE for self care, mobility, lifting, and ambulation/stair navigation      Manual Treatments:  PROM / STM / Oscillations-Mobs:  G-I, II, III, IV (PA's, Inf., Post.)  [x] (65754) Provided manual therapy to mobilize LE, proximal hip and/or LS spine soft tissue/joints for the purpose of modulating pain, promoting relaxation,  increasing ROM, reducing/eliminating soft tissue swelling/inflammation/restriction, improving soft tissue extensibility and allowing for proper ROM for normal function with self care, mobility, lifting and ambulation. Modalities:     [] GAME READY (VASO)- for significant edema, swelling, pain control.  x10'    Charges:  Timed Code Treatment Minutes: 50' Total Treatment Minutes: 50'         [] EVAL (LOW) 77223   [] EVAL (MOD) 60486  [] EVAL (HIGH) 89968   [] RE-EVAL     [x] UR(52984) x  2   [] IONTO  [] NMR (30898) x     [] VASO  [x] Manual (78237) x 1     [] Other:  [] TA x      [] Mech Traction (00214)  [] ES(attended) (89104)      [] ES (un) (94083):       GOALS:   Patient stated goal: Patient will be able to walk without L knee pain and limited motion. [] Progressing: [] Met: [] Not Met: [] Adjusted    Therapist goals for Patient:   Short Term Goals: To be achieved in: 2 weeks  1. Independent in HEP and progression per patient tolerance, in order to prevent re-injury. [] Progressing: [] Met: [] Not Met: [] Adjusted  2. Patient will have a decrease in pain to facilitate improvement in movement, function, and ADLs as indicated by Functional Deficits. [] Progressing: [] Met: [] Not Met: [] Adjusted    Long Term Goals: To be achieved in: 6 weeks  1. Disability index score of 14% or less for the LEFS to assist with reaching prior level of function. [] Progressing: [] Met: [] Not Met: [] Adjusted  2. Patient will demonstrate increased AROM to L KF to 125 deg and L KE to 0 deg to allow for proper joint functioning as indicated by patients Functional Deficits. [] Progressing: [] Met: [] Not Met: [] Adjusted  3. Patient will demonstrate an increase in Strength to good proximal hip strength and control, within 5lb HHD in LE to allow for proper functional mobility as indicated by patients Functional Deficits. [] Progressing: [] Met: [] Not Met: [] Adjusted  4. Patient will return to 95% of functional activities without increased symptoms or restriction. [] Progressing: [] Met: [] Not Met: [] Adjusted  5.  Patient will be able to return to golf and hiking without L knee pain and limited motion(patient specific functional goal)    [] Progressing: [] Met: [] Not Met: [] Adjusted       Progression Towards Functional goals: [] Patient is progressing as expected towards functional goals listed. [] Progression is slowed due to complexities listed. [] Progression has been slowed due to co-morbidities. [x] Plan just implemented, too soon to assess goals progression  [] Other:         Overall Progression Towards Functional goals/ Treatment Progress Update:  [] Patient is progressing as expected towards functional goals listed. [] Progression is slowed due to complexities/Impairments listed. [] Progression has been slowed due to co-morbidities. [x] Plan just implemented, too soon to assess goals progression <30days   [] Goals require adjustment due to lack of progress  [] Patient is not progressing as expected and requires additional follow up with physician  [] Other    Prognosis for POC: [x] Good [] Fair  [] Poor      Patient requires continued skilled intervention: [x] Yes  [] No    Treatment/Activity Tolerance:  [x] Patient able to complete treatment  [] Patient limited by fatigue  [] Patient limited by pain    [] Patient limited by other medical complications  [] Other:     ASSESSMENT: Patient is now ambulating with improved gait mechanics and progressed well with strengthening and mobility exercises each session. Continued focused shoulder be on functional strengthening specifically stairs. He was provided updated HEP this session. Patient has 1 more approved visit however patient would benefit from continued skilled PT in order to progress towards full return to functional mobility activities without limitations.      Return to Play: (if applicable)   []  Stage 1: Intro to Strength   []  Stage 2: Return to Run and Strength   []  Stage 3: Return to Jump and Strength   []  Stage 4: Dynamic Strength and Agility   []  Stage 5: Sport Specific Training     []  Ready to Return to Play, Meets All Above Stages   []  Not Ready for Return to Sports   Comments: PLAN: Cont L quad strength and L knee ROM. [x] Continue per plan of care [] Alter current plan (see comments above)  [] Plan of care initiated [] Hold pending MD visit [] Discharge      Electronically signed by:  Gypsy Maldonado, PT, DPT, \[facility]\"" [id number]     Note: If patient does not return for scheduled/ recommended follow up visits, this note will serve as a discharge from care along with most recent update on progress.

## 2021-02-12 ENCOUNTER — HOSPITAL ENCOUNTER (OUTPATIENT)
Dept: PHYSICAL THERAPY | Age: 61
Setting detail: THERAPIES SERIES
Discharge: HOME | End: 2021-02-12
Payer: COMMERCIAL

## 2021-02-12 PROCEDURE — 97140 MANUAL THERAPY 1/> REGIONS: CPT

## 2021-02-12 PROCEDURE — 97016 VASOPNEUMATIC DEVICE THERAPY: CPT

## 2021-02-12 PROCEDURE — 97110 THERAPEUTIC EXERCISES: CPT

## 2021-02-12 PROCEDURE — 97112 NEUROMUSCULAR REEDUCATION: CPT

## 2021-02-12 NOTE — PROGRESS NOTES
[facility], [address]  Phone: [phone]  Fax [phone]    Physical Therapy Treatment Note/ Progress Report:           Date:  2021    Patient Name:  Carmita Carlson    :  1960  MRN: [id number]  Restrictions/Precautions:    Medical/Treatment Diagnosis Information:  · Diagnosis: M25.562 Left knee pain; M17.12 Primary OA of the Left knee  · Treatment Diagnosis: M25.562 Left knee pain; M62.82 Left muscle weakness  Insurance/Certification information:  PT Insurance Information: [facility]  Physician Information:  Referring Practitioner: Julius Araujo MD  Has the plan of care been signed (Y/N):        []  Yes  [x]  No     Date of Patient follow up with Physician: 21      Is this a Progress Report:     [x]  Yes  []  No        If Yes:  Date Range for reporting period:  Beginning 1/15/21  Ending 21  Progress report will be due (10 Rx or 30 days whichever is less):  Next progress due     Recertification will be due (POC Duration  / 90 days whichever is less): 4/15/21      Visit # Insurance Allowable Auth Required   In-person 9 9 approved visits (1/15-21) [x]  Yes []  No    Telehealth     []  Yes []  No    Total          Functional Scale: LEFS 58 (28% disability)     Date assessed:  1/15/21      Therapy Diagnosis/Practice Pattern: H     Number of Comorbidities:  []0     [x]1-2    []3+     Latex Allergy:  [x]NO      []YES  Preferred Language for Healthcare:   [x]English       []other:      Pain level:  0/10 no pain medications taken. SUBJECTIVE:  Patient notes his knee continues to feel improvement. Limited in full knee mobility and ROM but is utilizing his bike at home to assist in improving this. OBJECTIVE: See eval  ? Observation: swelling of L knee down to ankle compared to R.  Pt does not have compression stocking and notes he hasn't been elevating/icing at home very often ? Test measurements:  See below      PT Practice Pattern:   Co morbidities:   surgical   INITIAL VISIT  CURRENT VISIT  2/12/21   PAIN  0/10 0/10    FUNCTIONAL SCALE LEFS 58/80; 28% disability  59/80; 26 % disability    ROM L KF 65 deg  123 deg      L KE Lacking 15 deg Lacking 2 deg                                     STRENGHT (MMT) L quad Good; 10 SLR w mild quad lag  Good +                                                         RESTRICTIONS/PRECAUTIONS: S/p L TKR    Exercises/Interventions:     Medbridge: [email or web address]    Therapeutic Ex (93850) Sets/sec Reps Notes/CUES   30\"x3     30\"x3     5'Hx10     30x, 3\"H  2#    10\"x10            30\"x3  Achieved 110 deg following   2x10     2x10, 3\"H     2x15     2x15 ea               5#         Slant board calf stretch 30\"x3     trustretch HS stretch 30\"x3     Trustretch KF stretch 30\"x3     2x15 ea  60#        2x15, 3\"H  2#   LP: DL 2x15  125#          2up, 1 down 2x15  125#   2x15  VC for equal WB   3P, 2x15            2x15  40#   2x15   35#   2x15   40#   15'x3     15'x3                 Pt education on current condition, POC, expectations for therapy, and HEP             Manual Intervention ([id number])      PFJ mobs, tibfem and tibfib mobs, PROM and KF stretching of L knee 8'      5'                             NMR re-education (89171)   CUES NEEDED   FSU 6\" 2x10     LSU 6\" 2x10     Sled pushes 20'x3  25#   5\"H, 15x ea     3'     20x 3\"H           7x 4 steps  30#   10x 2  From low mat         Therapeutic Activity (29032)                                                Therapeutic Exercise and NMR EXR  [x] (85178) Provided verbal/tactile cueing for activities related to strengthening, flexibility, endurance, ROM for improvements in LE, proximal hip, and core control with self care, mobility, lifting, ambulation. [x] (06814) Provided verbal/tactile cueing for activities related to improving balance, coordination, kinesthetic sense, posture, motor skill, proprioception  to assist with LE, proximal hip, and core control in self care, mobility, lifting, ambulation and eccentric single leg control. NMR and Therapeutic Activities:    [] (17249 or 36653) Provided verbal/tactile cueing for activities related to improving balance, coordination, kinesthetic sense, posture, motor skill, proprioception and motor activation to allow for proper function of core, proximal hip and LE with self care and ADLs  [] (44892) Gait Re-education- Provided training and instruction to the patient for proper LE, core and proximal hip recruitment and positioning and eccentric body weight control with ambulation re-education including up and down stairs     Home Exercise Program:    [x] (98651) Reviewed/Progressed HEP activities related to strengthening, flexibility, endurance, ROM of core, proximal hip and LE for functional self-care, mobility, lifting and ambulation/stair navigation   [] (44272)Reviewed/Progressed HEP activities related to improving balance, coordination, kinesthetic sense, posture, motor skill, proprioception of core, proximal hip and LE for self care, mobility, lifting, and ambulation/stair navigation      Manual Treatments:  PROM / STM / Oscillations-Mobs:  G-I, II, III, IV (PA's, Inf., Post.)  [x] (71485) Provided manual therapy to mobilize LE, proximal hip and/or LS spine soft tissue/joints for the purpose of modulating pain, promoting relaxation,  increasing ROM, reducing/eliminating soft tissue swelling/inflammation/restriction, improving soft tissue extensibility and allowing for proper ROM for normal function with self care, mobility, lifting and ambulation. Modalities:     [x] GAME READY (VASO)- for significant edema, swelling, pain control.  x10'    Charges:  Timed Code Treatment Minutes: 37' Total Treatment Minutes: 48'         [] EVAL (LOW) 10833   [] EVAL (MOD) 87554  [] EVAL (HIGH) 88557   [] RE-EVAL     [x] DF(57660) x  1   [] IONTO  [x] NMR (90655) x1     [x] VASOx10 '  [x] Manual (93973) x 1     [] Other:  [] TA x      [] Mech Traction (42900)  [] ES(attended) (43726)      [] ES (un) (97960):       GOALS:   Patient stated goal: Patient will be able to walk without L knee pain and limited motion. [x] Progressing: [] Met: [] Not Met: [] Adjusted    Therapist goals for Patient:   Short Term Goals: To be achieved in: 2 weeks  1. Independent in HEP and progression per patient tolerance, in order to prevent re-injury. [] Progressing: [x] Met: [] Not Met: [] Adjusted  2. Patient will have a decrease in pain to facilitate improvement in movement, function, and ADLs as indicated by Functional Deficits. [] Progressing: [x] Met: [] Not Met: [] Adjusted    Long Term Goals: To be achieved in: 6 weeks  1. Disability index score of 14% or less for the LEFS to assist with reaching prior level of function. [x] Progressing: [] Met: [] Not Met: [] Adjusted  2. Patient will demonstrate increased AROM to L KF to 125 deg and L KE to 0 deg to allow for proper joint functioning as indicated by patients Functional Deficits. [x] Progressing: [] Met: [] Not Met: [] Adjusted  3. Patient will demonstrate an increase in Strength to good proximal hip strength and control, within 5lb HHD in LE to allow for proper functional mobility as indicated by patients Functional Deficits. [] Progressing: [x] Met: [] Not Met: [] Adjusted  4. Patient will return to 95% of functional activities without increased symptoms or restriction. [x] Progressing: [] Met: [] Not Met: [] Adjusted  5.  Patient will be able to return to golf and hiking without L knee pain and limited motion(patient specific functional goal)    [x] Progressing: [] Met: [] Not Met: [] Adjusted       Progression Towards Functional goals: []  Stage 2: Return to Run and Strength   []  Stage 3: Return to Jump and Strength   []  Stage 4: Dynamic Strength and Agility   []  Stage 5: Sport Specific Training     []  Ready to Return to Play, Meets All Above Stages   []  Not Ready for Return to Sports   Comments:                               PLAN: Cont L quad strength and L knee ROM, mobility and functional strength. [x] Continue per plan of care [] Alter current plan (see comments above)  [] Plan of care initiated [] Hold pending MD visit [] Discharge      Electronically signed by:  Victor M Dubon, PT, DPT, [facility] [id number]     Note: If patient does not return for scheduled/ recommended follow up visits, this note will serve as a discharge from care along with most recent update on progress.

## 2021-02-16 ENCOUNTER — HOSPITAL ENCOUNTER (OUTPATIENT)
Dept: PHYSICAL THERAPY | Age: 61
Setting detail: THERAPIES SERIES
Discharge: HOME | End: 2021-02-16
Payer: COMMERCIAL

## 2021-02-16 PROCEDURE — 97016 VASOPNEUMATIC DEVICE THERAPY: CPT

## 2021-02-16 PROCEDURE — 97112 NEUROMUSCULAR REEDUCATION: CPT

## 2021-02-16 PROCEDURE — 97140 MANUAL THERAPY 1/> REGIONS: CPT

## 2021-02-16 PROCEDURE — 97110 THERAPEUTIC EXERCISES: CPT

## 2021-02-16 NOTE — PROGRESS NOTES
[facility], [address]  Phone: [phone]  Fax [phone]    Physical Therapy Treatment Note/ Progress Report:           Date:  2021    Patient Name:  James García    :  1960  MRN: [id number]  Restrictions/Precautions:    Medical/Treatment Diagnosis Information:  · Diagnosis: M25.562 Left knee pain; M17.12 Primary OA of the Left knee  · Treatment Diagnosis: M25.562 Left knee pain; M62.82 Left muscle weakness  Insurance/Certification information:  PT Insurance Information: Rhys Betancur 150  Physician Information:  Referring Practitioner: Litzy Tirado MD  Has the plan of care been signed (Y/N):        []  Yes  [x]  No     Date of Patient follow up with Physician: 21      Is this a Progress Report:     [x]  Yes  []  No         If Yes:  Date Range for reporting period:  Beginning 1/15/21  Ending 21  Progress report will be due (10 Rx or 30 days whichever is less):  Next progress due 16    Recertification will be due (POC Duration  / 90 days whichever is less): 4/15/21      Visit # Insurance Allowable Auth Required   In-person 10 9 approved visits (1/15-21)  2 additional visits approved (-3/13/21) [x]  Yes []  No    Telehealth     []  Yes []  No    Total          Functional Scale: LEFS 58 (28% disability)     Date assessed:  1/15/21      Therapy Diagnosis/Practice Pattern: H     Number of Comorbidities:  []0     [x]1-2    []3+     Latex Allergy:  [x]NO      []YES  Preferred Language for Healthcare:   [x]English       []other:      Pain level:  0/10 no pain medications taken. SUBJECTIVE:  Patient reports he has been  Noticing his knee less and less. He has had no issues since his last session. His next follow up appt with his surgeon is on 21.         OBJECTIVE: See sangeetha ? Observation: swelling of L knee down to ankle compared to R. Pt does not have compression stocking and notes he hasn't been elevating/icing at home very often   ?  Test measurements:  See below      PT Practice Pattern:   Co morbidities:   surgical   INITIAL VISIT  CURRENT VISIT  2/16/21   PAIN  0/10 0/10    FUNCTIONAL SCALE LEFS 58/80; 28% disability  NT This date: 59/80; 26 % disability    ROM L KF 65 deg  125 deg      L KE Lacking 15 deg Lacking 2 deg                                     STRENGHT (MMT) L quad Good; 10 SLR w mild quad lag  Good +                                                         RESTRICTIONS/PRECAUTIONS: S/p L TKR    Exercises/Interventions:     [employer]: [id number]    Therapeutic Ex (37732) Sets/sec Reps Notes/CUES   30\"x3     30\"x3     5'Hx10     30x, 3\"H  2#    10\"x10            30\"x3  Achieved 110 deg following   2x10     2x10, 3\"H     2x15     2x15 ea               5#         Slant board calf stretch 30\"x3     trustretch HS stretch 30\"x3     Trustretch KF stretch 30\"x3     2x15 ea  60#        2x15, 3\"H  2#   LP: DL 2x15  140#          2up, 1 down 2x15  140#   2x15  VC for equal WB   3P, 2x15            Leg ext 2x15  40#   Leg curls 2x15   35#   Seated DHR 2x15   40#   LBW OVL 15'x3     Monster walks 15'x3           Recumbent bike  6'     Pt education on current condition, POC, expectations for therapy, and HEP             Manual Intervention ([phone])      PFJ mobs, tibfem and tibfib mobs, PROM and KF stretching of L knee, prone quad stretch, seated KF MWM 8'     Massage stick to L quads, VL, ITB, L HS, gastroc 5'                             NMR re-education (63281)   CUES NEEDED   FSD 4\" 2x10      2x10     2x10      20'x3  25#   5\"H, 15x ea     3'     20x 3\"H     Side stepping + paloff press  2x10     CC retro [id number] 4 steps  40#   10x 2  From low mat         Therapeutic Activity (01080)                                                Therapeutic Exercise and NMR EXR 3. Patient will demonstrate an increase in Strength to good proximal hip strength and control, within 5lb HHD in LE to allow for proper functional mobility as indicated by patients Functional Deficits. [] Progressing: [x] Met: [] Not Met: [] Adjusted  4. Patient will return to 95% of functional activities without increased symptoms or restriction. [x] Progressing: [] Met: [] Not Met: [] Adjusted  5. Patient will be able to return to golf and hiking without L knee pain and limited motion(patient specific functional goal)    [x] Progressing: [] Met: [] Not Met: [] Adjusted       Progression Towards Functional goals:  [x] Patient is progressing as expected towards functional goals listed. [] Progression is slowed due to complexities listed. [] Progression has been slowed due to co-morbidities. [] Plan just implemented, too soon to assess goals progression  [] Other:         Overall Progression Towards Functional goals/ Treatment Progress Update:  [x] Patient is progressing as expected towards functional goals listed. [] Progression is slowed due to complexities/Impairments listed. [] Progression has been slowed due to co-morbidities.   [] Plan just implemented, too soon to assess goals progression <30days   [] Goals require adjustment due to lack of progress  [] Patient is not progressing as expected and requires additional follow up with physician  [] Other    Prognosis for POC: [x] Good [] Fair  [] Poor      Patient requires continued skilled intervention: [x] Yes  [] No    Treatment/Activity Tolerance:  [x] Patient able to complete treatment  [] Patient limited by fatigue  [] Patient limited by pain    [] Patient limited by other medical complications  [] Other: ASSESSMENT: Patient is now ambulating with improved gait mechanics and progressed well with strengthening and mobility exercises each session. He continues to lack full L knee ROM, strength, endurance however has been doing well with new challenges each session. Patient is not yet able to return to golfing or ambulating/hiking on uneven surfaces. Patient would also benefit from further balance training in order to limit risk of falls following L TKA. Continued focused should be on functional strengthening specifically stairs. Patient has used up current number of approved visit however patient would benefit from continued skilled PT in order to progress towards full return to functional mobility activities without limitations or risk of injury. He was approved for 2 additional visits. Due to this, patient's last appt will be at his [facility]- plan for D/C to [facility]. Return to Play: (if applicable)   []  Stage 1: Intro to Strength   []  Stage 2: Return to Run and Strength   []  Stage 3: Return to Jump and Strength   []  Stage 4: Dynamic Strength and Agility   []  Stage 5: Sport Specific Training     []  Ready to Return to Play, Meets All Above Stages   []  Not Ready for Return to Sports   Comments:                               PLAN: Cont L quad strength and L knee ROM, mobility and functional strength. D/C to [facility]. [x] Continue per plan of care [] Alter current plan (see comments above)  [] Plan of care initiated [] Hold pending MD visit [] Discharge      Electronically signed by:  Hernesto Gomez, PT, DPT, [facility] [id number]     Note: If patient does not return for scheduled/ recommended follow up visits, this note will serve as a discharge from care along with most recent update on progress.

## 2021-02-16 NOTE — OP NOTE
[facility], [address]  Phone: [phone]  Fax [phone]    Physical Therapy Treatment Note/ Op Note:           Date:  2021    Patient Name:  Mike Rojas    :  1960  MRN: [id number]  Restrictions/Precautions:    Medical/Treatment Diagnosis Information:  · Diagnosis: M25.562 Left knee pain; M17.12 Primary OA of the Left knee  · Treatment Diagnosis: M25.562 Left knee pain; M62.82 Left muscle weakness  Insurance/Certification information:  PT Insurance Information: Tati Ash  Physician Information:  Referring Practitioner: Ricardo Frank MD  Has the plan of care been signed (Y/N):        []  Yes  [x]  No     Date of Patient follow up with Physician: 21      Is this a Progress Report:     [x]  Yes  []  No         If Yes:  Date Range for reporting period:  Beginning 1/15/21  Ending 21  Progress report will be due (10 Rx or 30 days whichever is less):  Next progress due     Recertification will be due (POC Duration  / 90 days whichever is less): 4/15/21      Visit # Insurance Allowable Auth Required   In-person 10 9 approved visits (1/15-21)  2 additional visits approved (-3/13/21) [x]  Yes []  No    Telehealth     []  Yes []  No    Total          Functional Scale: LEFS 58 (28% disability)     Date assessed:  1/15/21      Therapy Diagnosis/Practice Pattern: H     Number of Comorbidities:  []0     [x]1-2    []3+     Latex Allergy:  [x]NO      []YES  Preferred Language for Healthcare:   [x]English       []other:      Pain level:  0/10 no pain medications taken. SUBJECTIVE:  Patient reports he has been  Noticing his knee less and less. He has had no issues since his last session. His next follow up appt with his surgeon is on 21.         OBJECTIVE: See sangeetha ? Observation: swelling of L knee down to ankle compared to R. Pt does not have compression stocking and notes he hasn't been elevating/icing at home very often   ? Test measurements:  See below      PT Practice Pattern:   Co morbidities:   surgical   INITIAL VISIT  CURRENT VISIT  2/16/21   PAIN  0/10 0/10    FUNCTIONAL SCALE LEFS 58/80; 28% disability  NT This date: 59/80; 26 % disability    ROM L KF 65 deg  125 deg      L KE Lacking 15 deg Lacking 2 deg                                     STRENGHT (MMT) L quad Good; 10 SLR w mild quad lag  Good +                                                         RESTRICTIONS/PRECAUTIONS: S/p L TKR    Therapeutic Exercise and NMR EXR  [x] (86671) Provided verbal/tactile cueing for activities related to strengthening, flexibility, endurance, ROM for improvements in LE, proximal hip, and core control with self care, mobility, lifting, ambulation. [x] (22729) Provided verbal/tactile cueing for activities related to improving balance, coordination, kinesthetic sense, posture, motor skill, proprioception  to assist with LE, proximal hip, and core control in self care, mobility, lifting, ambulation and eccentric single leg control.      NMR and Therapeutic Activities:    [] (05372 or 70102) Provided verbal/tactile cueing for activities related to improving balance, coordination, kinesthetic sense, posture, motor skill, proprioception and motor activation to allow for proper function of core, proximal hip and LE with self care and ADLs  [] (16607) Gait Re-education- Provided training and instruction to the patient for proper LE, core and proximal hip recruitment and positioning and eccentric body weight control with ambulation re-education including up and down stairs     Home Exercise Program: [x] (85615) Reviewed/Progressed HEP activities related to strengthening, flexibility, endurance, ROM of core, proximal hip and LE for functional self-care, mobility, lifting and ambulation/stair navigation   [] (69242)Reviewed/Progressed HEP activities related to improving balance, coordination, kinesthetic sense, posture, motor skill, proprioception of core, proximal hip and LE for self care, mobility, lifting, and ambulation/stair navigation      Manual Treatments:  PROM / STM / Oscillations-Mobs:  G-I, II, III, IV (PA's, Inf., Post.)  [x] (29967) Provided manual therapy to mobilize LE, proximal hip and/or LS spine soft tissue/joints for the purpose of modulating pain, promoting relaxation,  increasing ROM, reducing/eliminating soft tissue swelling/inflammation/restriction, improving soft tissue extensibility and allowing for proper ROM for normal function with self care, mobility, lifting and ambulation. Modalities:     [x] GAME READY (VASO)- for significant edema, swelling, pain control. x10'    Charges:  Timed Code Treatment Minutes: 55'   Total Treatment Minutes: 64'         [] EVAL (LOW) 92255   [] EVAL (MOD) 24731  [] EVAL (HIGH) 95238   [] RE-EVAL     [x] TT(20690) x  1   [] IONTO  [x] NMR (97415) x1     [x] VASOx10 '  [x] Manual (22327) x 1     [] Other:  [] TA x      [] Mech Traction (23992)  [] ES(attended) (32556)      [] ES (un) (86676):       GOALS:   Patient stated goal: Patient will be able to walk without L knee pain and limited motion. [x] Progressing: [] Met: [] Not Met: [] Adjusted    Therapist goals for Patient:   Short Term Goals: To be achieved in: 2 weeks  1. Independent in HEP and progression per patient tolerance, in order to prevent re-injury. [] Progressing: [x] Met: [] Not Met: [] Adjusted  2. Patient will have a decrease in pain to facilitate improvement in movement, function, and ADLs as indicated by Functional Deficits.   [] Progressing: [x] Met: [] Not Met: [] Adjusted Long Term Goals: To be achieved in: 6 weeks  1. Disability index score of 14% or less for the LEFS to assist with reaching prior level of function. [x] Progressing: [] Met: [] Not Met: [] Adjusted  2. Patient will demonstrate increased AROM to L KF to 125 deg and L KE to 0 deg to allow for proper joint functioning as indicated by patients Functional Deficits. [x] Progressing: [] Met: [] Not Met: [] Adjusted  3. Patient will demonstrate an increase in Strength to good proximal hip strength and control, within 5lb HHD in LE to allow for proper functional mobility as indicated by patients Functional Deficits. [] Progressing: [x] Met: [] Not Met: [] Adjusted  4. Patient will return to 95% of functional activities without increased symptoms or restriction. [x] Progressing: [] Met: [] Not Met: [] Adjusted  5. Patient will be able to return to golf and hiking without L knee pain and limited motion(patient specific functional goal)    [x] Progressing: [] Met: [] Not Met: [] Adjusted       Progression Towards Functional goals:  [x] Patient is progressing as expected towards functional goals listed. [] Progression is slowed due to complexities listed. [] Progression has been slowed due to co-morbidities. [] Plan just implemented, too soon to assess goals progression  [] Other:         Overall Progression Towards Functional goals/ Treatment Progress Update:  [x] Patient is progressing as expected towards functional goals listed. [] Progression is slowed due to complexities/Impairments listed. [] Progression has been slowed due to co-morbidities.   [] Plan just implemented, too soon to assess goals progression <30days   [] Goals require adjustment due to lack of progress  [] Patient is not progressing as expected and requires additional follow up with physician  [] Other    Prognosis for POC: [x] Good [] Fair  [] Poor      Patient requires continued skilled intervention: [x] Yes  [] No Treatment/Activity Tolerance:  [x] Patient able to complete treatment  [] Patient limited by fatigue  [] Patient limited by pain    [] Patient limited by other medical complications  [] Other:     ASSESSMENT: Patient is now ambulating with improved gait mechanics and progressed well with strengthening and mobility exercises each session. He continues to lack full L knee ROM, strength, endurance however has been doing well with new challenges each session. Patient is not yet able to return to golfing or ambulating/hiking on uneven surfaces. Patient would also benefit from further balance training in order to limit risk of falls following L TKA. Continued focused should be on functional strengthening specifically stairs. Patient has used up current number of approved visit however patient would benefit from continued skilled PT in order to progress towards full return to functional mobility activities without limitations or risk of injury. He was approved for 2 additional visits. Due to this, patient's last appt will be at his [facility]- plan for D/C to [facility]. Return to Play: (if applicable)   []  Stage 1: Intro to Strength   []  Stage 2: Return to Run and Strength   []  Stage 3: Return to Jump and Strength   []  Stage 4: Dynamic Strength and Agility   []  Stage 5: Sport Specific Training     []  Ready to Return to Play, Meets All Above Stages   []  Not Ready for Return to Sports   Comments:                               PLAN: Cont L quad strength and L knee ROM, mobility and functional strength. D/C to [facility]. [x] Continue per plan of care [] Alter current plan (see comments above)  [] Plan of care initiated [] Hold pending MD visit [] Discharge      Electronically signed by:  Denise Mir PT, DPT, [facility] [id number]     Note: If patient does not return for scheduled/ recommended follow up visits, this note will serve as a discharge from care along with most recent update on progress.

## 2021-02-19 ENCOUNTER — APPOINTMENT (OUTPATIENT)
Dept: PHYSICAL THERAPY | Age: 61
End: 2021-02-19
Payer: COMMERCIAL

## 2021-02-23 ENCOUNTER — APPOINTMENT (OUTPATIENT)
Dept: PHYSICAL THERAPY | Age: 61
End: 2021-02-23
Payer: COMMERCIAL

## 2021-02-26 ENCOUNTER — HOSPITAL ENCOUNTER (OUTPATIENT)
Dept: PHYSICAL THERAPY | Age: 61
Setting detail: THERAPIES SERIES
Discharge: HOME | End: 2021-02-26
Payer: COMMERCIAL

## 2021-02-26 PROCEDURE — 97112 NEUROMUSCULAR REEDUCATION: CPT

## 2021-02-26 PROCEDURE — 97110 THERAPEUTIC EXERCISES: CPT

## 2021-02-26 NOTE — DISCHARGE SUMMARY
[facility], [address]  Phone: [phone]  Fax [phone]    Physical Therapy Treatment Note/ Progress Report:           Date:  2021    Patient Name:  Vijaya Bassett    :  1960  MRN: [id number]  Restrictions/Precautions:    Medical/Treatment Diagnosis Information:  · Diagnosis: M25.562 Left knee pain; M17.12 Primary OA of the Left knee  · Treatment Diagnosis: M25.562 Left knee pain; M62.82 Left muscle weakness  Insurance/Certification information:  PT Insurance Information: [employer] Betancur 150  Physician Information:  Referring Practitioner: Juana Escalante MD  Has the plan of care been signed (Y/N):        []  Yes  [x]  No     Date of Patient follow up with Physician: 21      Is this a Progress Report:     []  Yes  [x]  No         If Yes:  Date Range for reporting period:  Beginning 1/15/21  Ending 21  Progress report will be due (10 Rx or 30 days whichever is less):  Next progress due     Recertification will be due (POC Duration  / 90 days whichever is less): 4/15/21      Visit # Insurance Allowable Auth Required   In-person 11 9 approved visits (1/15-21)  2 additional visits approved (-3/13/21) [x]  Yes []  No    Telehealth     []  Yes []  No    Total          Functional Scale: LEFS 58 (28% disability)     Date assessed:  1/15/21      Therapy Diagnosis/Practice Pattern: H     Number of Comorbidities:  []0     [x]1-2    []3+     Latex Allergy:  [x]NO      []YES  Preferred Language for Healthcare:   [x]English       []other:      Pain level:  0/10 no pain medications taken. SUBJECTIVE:  Patient reports that since his last session his knee has felt great. He is going to his club in his neighborhood to perform his stretching and exercises and to utilize the recumbent bike there.          OBJECTIVE: See sangeetha ? Observation: minimal swelling around L surgical site. Normalized gait mechanics without use of AD. ? Test measurements:  See below      PT Practice Pattern:   Co morbidities:   surgical   INITIAL VISIT  CURRENT VISIT  2/26/21   PAIN  0/10 0/10    FUNCTIONAL SCALE LEFS 58/80; 28% disability  64/80 20% disability    ROM L KF 65 deg  126 deg      L KE Lacking 15 deg 0 deg                                     STRENGHT (MMT) L quad Good; 10 SLR w mild quad lag  Good +                                                         RESTRICTIONS/PRECAUTIONS: S/p L TKR    Exercises/Interventions:     [employer]: [id number]    Therapeutic Ex (49960) Sets/sec Reps Notes/CUES   30\"x3     30\"x3     5'Hx10     30x, 3\"H  2#    10\"x10            30\"x3  Achieved 110 deg following   2x10     2x10, 3\"H     2x15     2x15 ea               5#         Slant board calf stretch 30\"x3     trustretch HS stretch 30\"x3     Trustretch KF stretch 30\"x3     2x15 ea  60#        2x15, 3\"H  2#   2x15  140#   2x15  140#   2x15  VC for equal WB   3P, 2x15            2x15  40#   2x15   35#   2x15   40#   15'x3     15'x3           Recumbent bike  6'     Pt education on current condition, POC, expectations for therapy, and HEP             Manual Intervention ([id number])                                        NMR re-education (46531)   CUES NEEDED    2x10      FSU 6\" 2x10     LSU 6\" 2x10     Sled pushes 20'x3  25#   5\"H, 15x ea     3'     20x 3\"H     2x10     10x 4 steps  40#   10x 2  From low mat         Swinging golf club and tennis racquet  3'  No pain or difficulty felt. Piston squats modified 2x10     Tandem balance chop/lift 2x10     Therapeutic Activity (01425)                                                Therapeutic Exercise and NMR EXR  [x] (72022) Provided verbal/tactile cueing for activities related to strengthening, flexibility, endurance, ROM for improvements in LE, proximal hip, and core control with self care, mobility, lifting, ambulation. Total Treatment Minutes: 52'         [] EVAL (LOW) 51714   [] EVAL (MOD) 32066  [] EVAL (HIGH) 10686   [] RE-EVAL     [x] BL(20518) x  1   [] IONTO  [x] NMR (66913) x2     [] VASO   [] Manual (13672) x     [] Other:  [] TA x      [] Mech Traction (42552)  [] ES(attended) (43302)      [] ES (un) (54864):       GOALS:   Patient stated goal: Patient will be able to walk without L knee pain and limited motion. [x] Progressing: [x] Met: [] Not Met: [] Adjusted    Therapist goals for Patient:   Short Term Goals: To be achieved in: 2 weeks  1. Independent in HEP and progression per patient tolerance, in order to prevent re-injury. [] Progressing: [x] Met: [] Not Met: [] Adjusted  2. Patient will have a decrease in pain to facilitate improvement in movement, function, and ADLs as indicated by Functional Deficits. [] Progressing: [x] Met: [] Not Met: [] Adjusted    Long Term Goals: To be achieved in: 6 weeks  1. Disability index score of 14% or less for the LEFS to assist with reaching prior level of function. 20%   [x] Progressing: [] Met: [] Not Met: [] Adjusted  2. Patient will demonstrate increased AROM to L KF to 125 deg and L KE to 0 deg to allow for proper joint functioning as indicated by patients Functional Deficits. [] Progressing: [x] Met: [] Not Met: [] Adjusted  3. Patient will demonstrate an increase in Strength to good proximal hip strength and control, within 5lb HHD in LE to allow for proper functional mobility as indicated by patients Functional Deficits. [] Progressing: [x] Met: [] Not Met: [] Adjusted  4. Patient will return to 95% of functional activities without increased symptoms or restriction. [] Progressing: [x] Met: [] Not Met: [] Adjusted  5.  Patient will be able to return to golf and hiking without L knee pain and limited motion(patient specific functional goal)- able to swing a golf club without pain or limitations; has not yet played full 18 holes due to weather [] Progressing: [x] Met: [] Not Met: [] Adjusted       Progression Towards Functional goals:  [x] Patient is progressing as expected towards functional goals listed. [] Progression is slowed due to complexities listed. [] Progression has been slowed due to co-morbidities. [] Plan just implemented, too soon to assess goals progression  [] Other:         Overall Progression Towards Functional goals/ Treatment Progress Update:  [x] Patient is progressing as expected towards functional goals listed. [] Progression is slowed due to complexities/Impairments listed. [] Progression has been slowed due to co-morbidities. [] Plan just implemented, too soon to assess goals progression <30days   [] Goals require adjustment due to lack of progress  [] Patient is not progressing as expected and requires additional follow up with physician  [] Other    Prognosis for POC: [x] Good [] Fair  [] Poor      Patient requires continued skilled intervention: [x] Yes  [] No    Treatment/Activity Tolerance:  [x] Patient able to complete treatment  [] Patient limited by fatigue  [] Patient limited by pain    [] Patient limited by other medical complications  [] Other:     ASSESSMENT: Patient is now ambulating with improved gait mechanics and progressed well with strengthening and mobility exercises each session. He has now achieved near full L knee ROM, strength, endurance and has been progressing strengthening on his own at home at his [employer] gym. Patient has been walking outside and even out in his yard without issue. He plans to return to golfing soon as weather permits and trialed his golf swing this session without limitations in his knee.  As patient has met nearly all of his STG and LTG and is independent in his HEP he is now appropriate for discharge from skilled PT with continued performance of his gym program.       Return to Play: (if applicable)   []  Stage 1: Intro to Strength